# Patient Record
Sex: MALE | Race: WHITE | Employment: FULL TIME | URBAN - METROPOLITAN AREA
[De-identification: names, ages, dates, MRNs, and addresses within clinical notes are randomized per-mention and may not be internally consistent; named-entity substitution may affect disease eponyms.]

---

## 2019-02-18 ENCOUNTER — HOSPITAL ENCOUNTER (EMERGENCY)
Age: 29
Discharge: HOME | End: 2019-02-18
Attending: EMERGENCY MEDICINE
Payer: COMMERCIAL

## 2019-02-18 ENCOUNTER — APPOINTMENT (OUTPATIENT)
Dept: GENERAL RADIOLOGY | Age: 29
End: 2019-02-18
Payer: COMMERCIAL

## 2019-02-18 VITALS
SYSTOLIC BLOOD PRESSURE: 109 MMHG | HEIGHT: 75 IN | RESPIRATION RATE: 14 BRPM | HEART RATE: 91 BPM | BODY MASS INDEX: 28.6 KG/M2 | WEIGHT: 230 LBS | TEMPERATURE: 99.1 F | OXYGEN SATURATION: 97 % | DIASTOLIC BLOOD PRESSURE: 82 MMHG

## 2019-02-18 DIAGNOSIS — R00.2 PALPITATIONS: Primary | ICD-10-CM

## 2019-02-18 DIAGNOSIS — I48.91 TRANSIENT ATRIAL FIBRILLATION (HCC): ICD-10-CM

## 2019-02-18 LAB
ANION GAP SERPL CALCULATED.3IONS-SCNC: 11 MMOL/L (ref 3–16)
BASOPHILS ABSOLUTE: 0.1 K/UL (ref 0–0.2)
BASOPHILS RELATIVE PERCENT: 0.7 %
BUN BLDV-MCNC: 10 MG/DL (ref 7–20)
CALCIUM SERPL-MCNC: 9.8 MG/DL (ref 8.3–10.6)
CHLORIDE BLD-SCNC: 104 MMOL/L (ref 99–110)
CO2: 28 MMOL/L (ref 21–32)
CREAT SERPL-MCNC: 0.8 MG/DL (ref 0.9–1.3)
EKG ATRIAL RATE: 86 BPM
EKG DIAGNOSIS: NORMAL
EKG P AXIS: 68 DEGREES
EKG P-R INTERVAL: 178 MS
EKG Q-T INTERVAL: 340 MS
EKG QRS DURATION: 92 MS
EKG QTC CALCULATION (BAZETT): 406 MS
EKG R AXIS: 62 DEGREES
EKG T AXIS: 54 DEGREES
EKG VENTRICULAR RATE: 86 BPM
EOSINOPHILS ABSOLUTE: 0.1 K/UL (ref 0–0.6)
EOSINOPHILS RELATIVE PERCENT: 1.4 %
GFR AFRICAN AMERICAN: >60
GFR NON-AFRICAN AMERICAN: >60
GLUCOSE BLD-MCNC: 107 MG/DL (ref 70–99)
HCT VFR BLD CALC: 48.4 % (ref 40.5–52.5)
HEMOGLOBIN: 16.7 G/DL (ref 13.5–17.5)
LYMPHOCYTES ABSOLUTE: 2 K/UL (ref 1–5.1)
LYMPHOCYTES RELATIVE PERCENT: 28.8 %
MCH RBC QN AUTO: 32 PG (ref 26–34)
MCHC RBC AUTO-ENTMCNC: 34.6 G/DL (ref 31–36)
MCV RBC AUTO: 92.6 FL (ref 80–100)
MONOCYTES ABSOLUTE: 0.4 K/UL (ref 0–1.3)
MONOCYTES RELATIVE PERCENT: 5.9 %
NEUTROPHILS ABSOLUTE: 4.4 K/UL (ref 1.7–7.7)
NEUTROPHILS RELATIVE PERCENT: 63.2 %
PDW BLD-RTO: 12.3 % (ref 12.4–15.4)
PLATELET # BLD: 268 K/UL (ref 135–450)
PMV BLD AUTO: 8.1 FL (ref 5–10.5)
POTASSIUM SERPL-SCNC: 4.6 MMOL/L (ref 3.5–5.1)
RBC # BLD: 5.23 M/UL (ref 4.2–5.9)
SODIUM BLD-SCNC: 143 MMOL/L (ref 136–145)
TROPONIN: <0.01 NG/ML
WBC # BLD: 7 K/UL (ref 4–11)

## 2019-02-18 PROCEDURE — 84484 ASSAY OF TROPONIN QUANT: CPT

## 2019-02-18 PROCEDURE — 71046 X-RAY EXAM CHEST 2 VIEWS: CPT

## 2019-02-18 PROCEDURE — 99285 EMERGENCY DEPT VISIT HI MDM: CPT

## 2019-02-18 PROCEDURE — 85025 COMPLETE CBC W/AUTO DIFF WBC: CPT

## 2019-02-18 PROCEDURE — 93005 ELECTROCARDIOGRAM TRACING: CPT | Performed by: NURSE PRACTITIONER

## 2019-02-18 PROCEDURE — 80048 BASIC METABOLIC PNL TOTAL CA: CPT

## 2019-02-18 PROCEDURE — 36415 COLL VENOUS BLD VENIPUNCTURE: CPT

## 2019-02-18 PROCEDURE — 6370000000 HC RX 637 (ALT 250 FOR IP): Performed by: PHYSICIAN ASSISTANT

## 2019-02-18 PROCEDURE — 93010 ELECTROCARDIOGRAM REPORT: CPT | Performed by: INTERNAL MEDICINE

## 2019-02-18 RX ORDER — ASPIRIN 81 MG/1
324 TABLET, CHEWABLE ORAL ONCE
Status: COMPLETED | OUTPATIENT
Start: 2019-02-18 | End: 2019-02-18

## 2019-02-18 RX ADMIN — ASPIRIN 81 MG 324 MG: 81 TABLET ORAL at 11:56

## 2019-02-18 ASSESSMENT — ENCOUNTER SYMPTOMS
NAUSEA: 0
COUGH: 0
SHORTNESS OF BREATH: 0
CONSTIPATION: 0
ABDOMINAL DISTENTION: 0
RECTAL PAIN: 0
BACK PAIN: 0
VOMITING: 0
ABDOMINAL PAIN: 0
STRIDOR: 0
DIARRHEA: 0
COLOR CHANGE: 0
WHEEZING: 0

## 2019-03-11 ENCOUNTER — HOSPITAL ENCOUNTER (OUTPATIENT)
Age: 29
Discharge: HOME | End: 2019-03-11
Payer: COMMERCIAL

## 2019-03-11 ENCOUNTER — OFFICE VISIT (OUTPATIENT)
Dept: CARDIOLOGY CLINIC | Age: 29
End: 2019-03-11
Payer: COMMERCIAL

## 2019-03-11 VITALS
WEIGHT: 238.12 LBS | BODY MASS INDEX: 29.61 KG/M2 | HEIGHT: 75 IN | SYSTOLIC BLOOD PRESSURE: 136 MMHG | HEART RATE: 92 BPM | DIASTOLIC BLOOD PRESSURE: 84 MMHG

## 2019-03-11 DIAGNOSIS — I48.0 PAROXYSMAL ATRIAL FIBRILLATION (HCC): ICD-10-CM

## 2019-03-11 LAB — TSH REFLEX: 0.92 UIU/ML (ref 0.27–4.2)

## 2019-03-11 PROCEDURE — 84443 ASSAY THYROID STIM HORMONE: CPT

## 2019-03-11 PROCEDURE — 99202 OFFICE O/P NEW SF 15 MIN: CPT | Performed by: INTERNAL MEDICINE

## 2019-03-11 PROCEDURE — 36415 COLL VENOUS BLD VENIPUNCTURE: CPT

## 2019-03-12 ENCOUNTER — TELEPHONE (OUTPATIENT)
Dept: CARDIOLOGY CLINIC | Age: 29
End: 2019-03-12

## 2019-05-30 ENCOUNTER — HOSPITAL ENCOUNTER (EMERGENCY)
Age: 29
Discharge: HOME | End: 2019-05-30
Attending: EMERGENCY MEDICINE
Payer: COMMERCIAL

## 2019-05-30 ENCOUNTER — APPOINTMENT (OUTPATIENT)
Dept: GENERAL RADIOLOGY | Age: 29
End: 2019-05-30
Payer: COMMERCIAL

## 2019-05-30 VITALS
WEIGHT: 230.13 LBS | BODY MASS INDEX: 28.61 KG/M2 | TEMPERATURE: 98.6 F | SYSTOLIC BLOOD PRESSURE: 149 MMHG | RESPIRATION RATE: 16 BRPM | OXYGEN SATURATION: 98 % | HEART RATE: 84 BPM | DIASTOLIC BLOOD PRESSURE: 86 MMHG | HEIGHT: 75 IN

## 2019-05-30 DIAGNOSIS — R00.2 PALPITATIONS: Primary | ICD-10-CM

## 2019-05-30 LAB
A/G RATIO: 1.6 (ref 1.1–2.2)
ALBUMIN SERPL-MCNC: 4.8 G/DL (ref 3.4–5)
ALP BLD-CCNC: 79 U/L (ref 40–129)
ALT SERPL-CCNC: 25 U/L (ref 10–40)
ANION GAP SERPL CALCULATED.3IONS-SCNC: 13 MMOL/L (ref 3–16)
APTT: 34.2 SEC (ref 26–36)
AST SERPL-CCNC: 20 U/L (ref 15–37)
BASOPHILS ABSOLUTE: 0 K/UL (ref 0–0.2)
BASOPHILS RELATIVE PERCENT: 0.6 %
BILIRUB SERPL-MCNC: 0.6 MG/DL (ref 0–1)
BUN BLDV-MCNC: 11 MG/DL (ref 7–20)
CALCIUM SERPL-MCNC: 9.8 MG/DL (ref 8.3–10.6)
CHLORIDE BLD-SCNC: 103 MMOL/L (ref 99–110)
CO2: 22 MMOL/L (ref 21–32)
CREAT SERPL-MCNC: 0.9 MG/DL (ref 0.9–1.3)
EKG ATRIAL RATE: 90 BPM
EKG DIAGNOSIS: NORMAL
EKG P AXIS: 44 DEGREES
EKG P-R INTERVAL: 158 MS
EKG Q-T INTERVAL: 350 MS
EKG QRS DURATION: 94 MS
EKG QTC CALCULATION (BAZETT): 428 MS
EKG R AXIS: 61 DEGREES
EKG T AXIS: 36 DEGREES
EKG VENTRICULAR RATE: 90 BPM
EOSINOPHILS ABSOLUTE: 0.1 K/UL (ref 0–0.6)
EOSINOPHILS RELATIVE PERCENT: 1 %
GFR AFRICAN AMERICAN: >60
GFR NON-AFRICAN AMERICAN: >60
GLOBULIN: 3 G/DL
GLUCOSE BLD-MCNC: 106 MG/DL (ref 70–99)
HCT VFR BLD CALC: 49.3 % (ref 40.5–52.5)
HEMOGLOBIN: 17.1 G/DL (ref 13.5–17.5)
INR BLD: 1.18 (ref 0.86–1.14)
LYMPHOCYTES ABSOLUTE: 1.5 K/UL (ref 1–5.1)
LYMPHOCYTES RELATIVE PERCENT: 17.4 %
MCH RBC QN AUTO: 31.3 PG (ref 26–34)
MCHC RBC AUTO-ENTMCNC: 34.6 G/DL (ref 31–36)
MCV RBC AUTO: 90.5 FL (ref 80–100)
MONOCYTES ABSOLUTE: 0.4 K/UL (ref 0–1.3)
MONOCYTES RELATIVE PERCENT: 5 %
NEUTROPHILS ABSOLUTE: 6.4 K/UL (ref 1.7–7.7)
NEUTROPHILS RELATIVE PERCENT: 76 %
PDW BLD-RTO: 12.8 % (ref 12.4–15.4)
PLATELET # BLD: 249 K/UL (ref 135–450)
PMV BLD AUTO: 8.8 FL (ref 5–10.5)
POTASSIUM SERPL-SCNC: 4.4 MMOL/L (ref 3.5–5.1)
PROTHROMBIN TIME: 13.5 SEC (ref 9.8–13)
RBC # BLD: 5.45 M/UL (ref 4.2–5.9)
SODIUM BLD-SCNC: 138 MMOL/L (ref 136–145)
TOTAL PROTEIN: 7.8 G/DL (ref 6.4–8.2)
TROPONIN: <0.01 NG/ML
WBC # BLD: 8.4 K/UL (ref 4–11)

## 2019-05-30 PROCEDURE — 85730 THROMBOPLASTIN TIME PARTIAL: CPT

## 2019-05-30 PROCEDURE — 93010 ELECTROCARDIOGRAM REPORT: CPT | Performed by: INTERNAL MEDICINE

## 2019-05-30 PROCEDURE — 85610 PROTHROMBIN TIME: CPT

## 2019-05-30 PROCEDURE — 80053 COMPREHEN METABOLIC PANEL: CPT

## 2019-05-30 PROCEDURE — 85025 COMPLETE CBC W/AUTO DIFF WBC: CPT

## 2019-05-30 PROCEDURE — 71045 X-RAY EXAM CHEST 1 VIEW: CPT

## 2019-05-30 PROCEDURE — 99285 EMERGENCY DEPT VISIT HI MDM: CPT

## 2019-05-30 PROCEDURE — 84484 ASSAY OF TROPONIN QUANT: CPT

## 2019-05-30 PROCEDURE — 93005 ELECTROCARDIOGRAM TRACING: CPT | Performed by: EMERGENCY MEDICINE

## 2019-05-30 NOTE — ED PROVIDER NOTES
Emergency Department Provider Note  Location: [address]  5/30/2019     Patient Identification  Skylar Dias is a 34 y.o. male    Chief Complaint  Palpitations (c/o palpitaions onset this am, history of a-fib )      Mode of Arrival  private car (patient drove)    HPI  (History provided by patient)  This is a 34 y.o. male presented today for \"skip beat\". Patient stated he had similar episode once back in February. He had EKG done at urgent care that was concerning with a-fib with  and sent to our ED. His symptoms already resolved by the time he got to our ED. He follow-up with cardiology and was told to get an echo. He admits he did not get the echo because of a $900 co-pay required by his insurance. He has not followed up since. He returned today because he had an episode of palpitation that felt similar to last about 3 hours. This started while he was at work. 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 By the time he got here, the symptoms started to resolve. He denies excessive caffeine intake. In fact he had none this morning. He said he had his TSH checked in February and it was normal.     ROS  10 systems reviewed, pertinent positives per HPI otherwise noted to be negative    I have reviewed the following nursing documentation:  Allergies: No Known Allergies    Past medical history: none reported    Past surgical history:  has a past surgical history that includes Hand surgery and Tympanostomy tube placement. Home medications: none reported    Social history:  reports that he has been smoking. He has a 2.00 pack-year smoking history. He uses smokeless tobacco. He reports that he drinks alcohol. He reports that he does not use drugs.     Family history:    Family History   Problem Relation Age of Onset    Diabetes Mother     Arthritis Father     Asthma Father        Exam  ED Triage Vitals [05/30/19 1047]   BP Temp Temp Source Pulse Resp SpO2 Height Weight   (!) 143/82 98.6 °F (37 °C) Oral 97 15 95 % 6' 3\" (1.905 m) 230 lb 2 oz (104.4 kg)   Physical Exam   Constitutional: He is oriented to person, place, and time. He appears well-developed and well-nourished. No distress. HENT:   Head: Normocephalic and atraumatic. Eyes: Pupils are equal, round, and reactive to light. Conjunctivae are normal. Right eye exhibits no discharge. Left eye exhibits no discharge. No scleral icterus. Neck: Neck supple. No tracheal deviation present. Cardiovascular: Normal rate, regular rhythm and normal heart sounds. No murmur heard. Pulmonary/Chest: Effort normal and breath sounds normal. No stridor. No respiratory distress. He has no wheezes. Abdominal: Soft. Bowel sounds are normal. He exhibits no distension. There is no tenderness. There is no rebound and no guarding. Musculoskeletal: He exhibits no edema or deformity. Neurological: He is alert and oriented to person, place, and time. He has normal strength. He displays no atrophy. He exhibits normal muscle tone. Skin: Skin is warm and dry. No rash noted. He is not diaphoretic. No cyanosis or erythema. Psychiatric: His behavior is normal.   Nursing note and vitals reviewed. MDM/ED Course  EKG  The Ekg interpreted by me in the absence of a cardiologist shows. normal sinus rhythm with a rate of 90  Axis is   Normal  QTc is  normal  Intervals and Durations are unremarkable. No specific ST-T wave changes appreciated. No evidence of acute ischemia. No significant change from prior EKG dated 2/18/19      Radiology  Xr Chest Portable    Result Date: 5/30/2019  EXAMINATION: ONE XRAY VIEW OF THE CHEST 5/30/2019 11:45 am COMPARISON: February 18, 2019. HISTORY: ORDERING SYSTEM PROVIDED HISTORY: palpitations TECHNOLOGIST PROVIDED HISTORY: Reason for exam:->palpitations Ordering Physician Provided Reason for Exam: Palpitations.  Acuity: Acute Type of Exam: Initial FINDINGS: Cardiac and mediastinal contours are within normal limits. Lungs are hyperinflated, with prominence of interstitial lung markings. No focal consolidation, pneumothorax, or significant pleural effusion. No evidence of acute osseous abnormalities. 1. No radiographic evidence of acute cardiopulmonary process. 2. Hyperinflation of lungs may be seen in setting of COPD or asthma.        Labs  Results for orders placed or performed during the hospital encounter of 05/30/19   CBC Auto Differential   Result Value Ref Range    WBC 8.4 4.0 - 11.0 K/uL    RBC 5.45 4.20 - 5.90 M/uL    Hemoglobin 17.1 13.5 - 17.5 g/dL    Hematocrit 49.3 40.5 - 52.5 %    MCV 90.5 80.0 - 100.0 fL    MCH 31.3 26.0 - 34.0 pg    MCHC 34.6 31.0 - 36.0 g/dL    RDW 12.8 12.4 - 15.4 %    Platelets 267 332 - 672 K/uL    MPV 8.8 5.0 - 10.5 fL    Neutrophils % 76.0 %    Lymphocytes % 17.4 %    Monocytes % 5.0 %    Eosinophils % 1.0 %    Basophils % 0.6 %    Neutrophils # 6.4 1.7 - 7.7 K/uL    Lymphocytes # 1.5 1.0 - 5.1 K/uL    Monocytes # 0.4 0.0 - 1.3 K/uL    Eosinophils # 0.1 0.0 - 0.6 K/uL    Basophils # 0.0 0.0 - 0.2 K/uL   Comprehensive Metabolic Panel   Result Value Ref Range    Sodium 138 136 - 145 mmol/L    Potassium 4.4 3.5 - 5.1 mmol/L    Chloride 103 99 - 110 mmol/L    CO2 22 21 - 32 mmol/L    Anion Gap 13 3 - 16    Glucose 106 (H) 70 - 99 mg/dL    BUN 11 7 - 20 mg/dL    CREATININE 0.9 0.9 - 1.3 mg/dL    GFR Non-African American >60 >60    GFR African American >60 >60    Calcium 9.8 8.3 - 10.6 mg/dL    Total Protein 7.8 6.4 - 8.2 g/dL    Alb 4.8 3.4 - 5.0 g/dL    Albumin/Globulin Ratio 1.6 1.1 - 2.2    Total Bilirubin 0.6 0.0 - 1.0 mg/dL    Alkaline Phosphatase 79 40 - 129 U/L    ALT 25 10 - 40 U/L    AST 20 15 - 37 U/L    Globulin 3.0 g/dL   Protime-INR   Result Value Ref Range    Protime 13.5 (H) 9.8 - 13.0 sec    INR 1.18 (H) 0.86 - 1.14   APTT   Result Value Ref Range    aPTT 34.2 26.0 - 36.0 sec   Troponin   Result Value Ref Range    Troponin <0.01 <0.01 ng/mL       - Patient seen and evaluated in room 32.  34 y.o. male presented for palpitations. He had an episode of A. fib reported on urgent cares EKG back in February. Had another episode since then. His symptoms are resolved before coming here today. His rhythm here has been sinus the entire time. We will refer him back to cardiology for further evaluation. Workup otherwise unremarkable with normal lab and CXR. I estimate there is LOW risk for ACUTE CORONARY SYNDROME, CHRONIC OBSTRUCTIVE PULMONARY DISEASE, CONGESTIVE HEART FAILURE, PERICARDIAL TAMPONADE, PNEUMONIA, PNEUMOTHORAX, PULMONARY EMBOLISM, SEPSIS, and THORACIC DISSECTION,  thus I consider the discharge disposition reasonable. Kurtis Roca and I have discussed the diagnosis and risks, and we agree with discharging home to follow-up with their cardiologist. We also discussed returning to the Emergency Department immediately if new or worsening symptoms occur. We have discussed the symptoms which are most concerning (e.g., bloody sputum, fever, worsening pain or shortness of breath, vomiting) that necessitate immediate return. Clinical Impression:  1. Palpitations        Disposition:  Discharge to home in good condition. Blood pressure (!) 143/82, pulse 97, temperature 98.6 °F (37 °C), temperature source Oral, resp. rate 15, height 6' 3\" (1.905 m), weight 230 lb 2 oz (104.4 kg), SpO2 95 %. This chart was generated in part by using Dragon Dictation system and may contain errors related to that system including errors in grammar, punctuation, and spelling, as well as words and phrases that may be inappropriate. If there are any questions or concerns please feel free to contact the dictating provider for clarification.      Francisco Javier Jain MD  [address] [place] [address] Tereza Delgado MD  05/30/19 6084

## 2019-05-30 NOTE — LETTER
[facility] Emergency Department  [address] [facility] [address]             May 30, 2019    Patient: Paula Ruffin   YOB: 1990   Date of Visit: 5/30/2019       To Whom It May Concern:    Paula Ruffin was seen and treated in our emergency department on 5/30/2019. He may return to work on 6/1/2019.       Sincerely,         [facility]

## 2020-03-25 ENCOUNTER — OFFICE VISIT (OUTPATIENT)
Dept: CARDIOLOGY CLINIC | Age: 30
End: 2020-03-25
Payer: COMMERCIAL

## 2020-03-25 ENCOUNTER — TELEPHONE (OUTPATIENT)
Dept: CARDIOLOGY CLINIC | Age: 30
End: 2020-03-25

## 2020-03-25 VITALS
BODY MASS INDEX: 32.2 KG/M2 | WEIGHT: 259 LBS | SYSTOLIC BLOOD PRESSURE: 140 MMHG | HEIGHT: 75 IN | DIASTOLIC BLOOD PRESSURE: 92 MMHG | HEART RATE: 105 BPM

## 2020-03-25 PROCEDURE — 99214 OFFICE O/P EST MOD 30 MIN: CPT | Performed by: INTERNAL MEDICINE

## 2020-03-25 NOTE — PROGRESS NOTES
[address]   Cardiac Follow Up     Referring Provider:  Val Manzanares     Chief Complaint   Patient presents with    Follow-up     [facility] on the left side of body/Afib    Atrial Fibrillation        History of Present Illness:  Shad Shrestha is a 34 y.o.  man with a history of paroxysmal atrial fibrillation which is likely due to alcohol use. He is here in ED follow up. He states that 2 days ago he had an episode of severe chest pain accompanied by numbness on the  the left side of his body from his face, left arm and leg, he felt he was in afib at the time. He visited the ED, CT of the Chest was negative, EKG showed ST. He reports that he has frequent episodes of chest pain. He states that the pain is in the center of his chest, states chest is sore to palpitation. He reports that he uses alcohol regularly . He also admits to anabolic steroids use. His mother is with him for the visit. Past Medical History:   has a past medical history of Heart palpitations. Surgical History:   has a past surgical history that includes Hand surgery and Tympanostomy tube placement. Social History:   reports that he has been smoking. He has a 2.00 pack-year smoking history. He uses smokeless tobacco. He reports current alcohol use. He reports that he does not use drugs. Family History:  family history includes Arthritis in his father; Asthma in his father; Diabetes in his mother. Home Medications:  Prior to Admission medications    Medication Sig Start Date End Date Taking? Authorizing Provider   metoprolol tartrate (LOPRESSOR) 25 MG tablet Take 1 tablet by mouth 2 times daily 3/25/20  Yes Fabio Green MD        Allergies:  Patient has no known allergies. Review of Systems:   · Constitutional: there has been no unanticipated weight loss. There's been no change in energy level, sleep pattern, or activity level. · Eyes: No visual changes or diplopia.  No scleral extremities well  · Exhibits normal gait balance and coordination  · No abnormalities of mood, affect, memory, mentation, or behavior are noted      Assessment:      Chest pain   Plan:stress echo asap     Paroxymal atrial fib   EKG 3/23/20> ST,    Regular today     Hypertension   Blood pressure (!) 140/92, pulse 105, height 6' 3\" (1.905 m), weight 259 lb (117.5 kg). Elevated     Alcohol and steroid use  Strongly encouraged cessation. Also likely cocaine use which he does not acknowledge      Plan:  Cardiac test and lab results personally reviewed by me during this office visit and discussed. Strongly encouraged alcohol and steroid use cessation. Continue risk factor modifications. Call for any change in symptoms. Return for regular follow up in 3 months or sooner based on testing results. I appreciate the opportunity of cooperating in the care of this individual.    Yuly Hearing. Cheryl Lockhart M.D., [facility]    Patient's problem list, medications, allergies, past medical, surgical, social and family histories were reviewed and updated as appropriate. Scribe's attestation: This note was scribed in the presence of Dr. Cheryl Lockhart MD, by Yulia De La Torre RN. The scribe's documentation has been prepared under my direction and personally reviewed by me in its entirety. I confirm that the note above accurately reflects all work, treatment, procedures, and medical decision making performed by me.

## 2020-04-01 ENCOUNTER — OFFICE VISIT (OUTPATIENT)
Dept: CARDIOLOGY CLINIC | Age: 30
End: 2020-04-01
Payer: COMMERCIAL

## 2020-04-01 LAB
LV EF: 50 %
LVEF MODALITY: NORMAL

## 2020-04-01 PROCEDURE — 93351 STRESS TTE COMPLETE: CPT | Performed by: INTERNAL MEDICINE

## 2020-04-01 PROCEDURE — 93325 DOPPLER ECHO COLOR FLOW MAPG: CPT | Performed by: INTERNAL MEDICINE

## 2020-04-01 PROCEDURE — 93320 DOPPLER ECHO COMPLETE: CPT | Performed by: INTERNAL MEDICINE

## 2020-04-01 NOTE — LETTER
[address] [facility] Cardiology  [address] [place] [id number]  Phone: [phone]  Fax: [phone]          April 1, 2020     Patient: Robina Moreira       Date of Visit: 4/1/2020       To Whom it May Concern:    Robina Moreira was seen in my clinic on 4/1/2020. He may return to work on 4/1/20. If you have any questions or concerns, please don't hesitate to call.     Sincerely,       Francisco Santamaria MD

## 2021-01-04 ENCOUNTER — TELEPHONE (OUTPATIENT)
Dept: CARDIOLOGY CLINIC | Age: 31
End: 2021-01-04

## 2021-01-04 NOTE — TELEPHONE ENCOUNTER
[facility] get [facility] following [facility].  Pls call to advise Thank you      Medication Refill    Medication needing refilled: metoprolol tartrate (LOPRESSOR) 25 MG tablet     Dosage of the medication: 25 mg    How are you taking this medication (QD, BID, TID, QID, PRN): 1 tab BID    30 or 90 day supply called in: 80    Which Pharmacy are we sending the medication to?:[facility] [address] [facility] [phone] [facility] [phone]   [address] [place] [address], [address] [place] [address]   Phone:  [phone]  Fax:  [phone]

## 2021-01-04 NOTE — TELEPHONE ENCOUNTER
Patient called and requested refill. Refill was E-prescribed to pharmacy.  Pt has an apt scheduled in February

## 2021-01-07 ENCOUNTER — APPOINTMENT (OUTPATIENT)
Dept: GENERAL RADIOLOGY | Age: 31
End: 2021-01-07
Payer: COMMERCIAL

## 2021-01-07 ENCOUNTER — TELEPHONE (OUTPATIENT)
Dept: CARDIOLOGY CLINIC | Age: 31
End: 2021-01-07

## 2021-01-07 ENCOUNTER — HOSPITAL ENCOUNTER (OUTPATIENT)
Age: 31
Setting detail: OBSERVATION
Discharge: HOME | End: 2021-01-08
Attending: EMERGENCY MEDICINE | Admitting: INTERNAL MEDICINE
Payer: COMMERCIAL

## 2021-01-07 DIAGNOSIS — I48.0 PAROXYSMAL ATRIAL FIBRILLATION (HCC): ICD-10-CM

## 2021-01-07 DIAGNOSIS — I48.91 ATRIAL FIBRILLATION WITH RVR (HCC): Primary | ICD-10-CM

## 2021-01-07 LAB
A/G RATIO: 1.4 (ref 1.1–2.2)
ALBUMIN SERPL-MCNC: 4.2 G/DL (ref 3.4–5)
ALP BLD-CCNC: 54 U/L (ref 40–129)
ALT SERPL-CCNC: 32 U/L (ref 10–40)
ANION GAP SERPL CALCULATED.3IONS-SCNC: 10 MMOL/L (ref 3–16)
APTT: 29.1 SEC (ref 24.2–36.2)
AST SERPL-CCNC: 30 U/L (ref 15–37)
BASOPHILS ABSOLUTE: 0 K/UL (ref 0–0.2)
BASOPHILS RELATIVE PERCENT: 0.5 %
BILIRUB SERPL-MCNC: 0.6 MG/DL (ref 0–1)
BUN BLDV-MCNC: 10 MG/DL (ref 7–20)
CALCIUM SERPL-MCNC: 9.1 MG/DL (ref 8.3–10.6)
CHLORIDE BLD-SCNC: 104 MMOL/L (ref 99–110)
CO2: 23 MMOL/L (ref 21–32)
CREAT SERPL-MCNC: 0.8 MG/DL (ref 0.9–1.3)
EKG ATRIAL RATE: 122 BPM
EKG DIAGNOSIS: NORMAL
EKG Q-T INTERVAL: 280 MS
EKG QRS DURATION: 90 MS
EKG QTC CALCULATION (BAZETT): 389 MS
EKG R AXIS: 43 DEGREES
EKG T AXIS: 42 DEGREES
EKG VENTRICULAR RATE: 116 BPM
EOSINOPHILS ABSOLUTE: 0.1 K/UL (ref 0–0.6)
EOSINOPHILS RELATIVE PERCENT: 0.6 %
ETHANOL: NORMAL MG/DL (ref 0–0.08)
GFR AFRICAN AMERICAN: >60
GFR NON-AFRICAN AMERICAN: >60
GLOBULIN: 3 G/DL
GLUCOSE BLD-MCNC: 110 MG/DL (ref 70–99)
HCT VFR BLD CALC: 48.8 % (ref 40.5–52.5)
HEMOGLOBIN: 16.5 G/DL (ref 13.5–17.5)
INR BLD: 1.12 (ref 0.86–1.14)
LYMPHOCYTES ABSOLUTE: 1.1 K/UL (ref 1–5.1)
LYMPHOCYTES RELATIVE PERCENT: 11 %
MCH RBC QN AUTO: 32.9 PG (ref 26–34)
MCHC RBC AUTO-ENTMCNC: 33.7 G/DL (ref 31–36)
MCV RBC AUTO: 97.7 FL (ref 80–100)
MONOCYTES ABSOLUTE: 0.7 K/UL (ref 0–1.3)
MONOCYTES RELATIVE PERCENT: 6.7 %
NEUTROPHILS ABSOLUTE: 8 K/UL (ref 1.7–7.7)
NEUTROPHILS RELATIVE PERCENT: 81.2 %
PDW BLD-RTO: 13.3 % (ref 12.4–15.4)
PLATELET # BLD: 308 K/UL (ref 135–450)
PMV BLD AUTO: 7.8 FL (ref 5–10.5)
POTASSIUM REFLEX MAGNESIUM: 3.7 MMOL/L (ref 3.5–5.1)
PRO-BNP: 617 PG/ML (ref 0–124)
PROTHROMBIN TIME: 13 SEC (ref 10–13.2)
RBC # BLD: 5 M/UL (ref 4.2–5.9)
REASON FOR REJECTION: NORMAL
REASON FOR REJECTION: NORMAL
REJECTED TEST: NORMAL
REJECTED TEST: NORMAL
SODIUM BLD-SCNC: 137 MMOL/L (ref 136–145)
TOTAL PROTEIN: 7.2 G/DL (ref 6.4–8.2)
TROPONIN: <0.01 NG/ML
WBC # BLD: 9.8 K/UL (ref 4–11)

## 2021-01-07 PROCEDURE — 85025 COMPLETE CBC W/AUTO DIFF WBC: CPT

## 2021-01-07 PROCEDURE — 84443 ASSAY THYROID STIM HORMONE: CPT

## 2021-01-07 PROCEDURE — 2500000003 HC RX 250 WO HCPCS: Performed by: EMERGENCY MEDICINE

## 2021-01-07 PROCEDURE — 6370000000 HC RX 637 (ALT 250 FOR IP): Performed by: INTERNAL MEDICINE

## 2021-01-07 PROCEDURE — 96375 TX/PRO/DX INJ NEW DRUG ADDON: CPT

## 2021-01-07 PROCEDURE — 2580000003 HC RX 258: Performed by: INTERNAL MEDICINE

## 2021-01-07 PROCEDURE — G0378 HOSPITAL OBSERVATION PER HR: HCPCS

## 2021-01-07 PROCEDURE — 6370000000 HC RX 637 (ALT 250 FOR IP): Performed by: EMERGENCY MEDICINE

## 2021-01-07 PROCEDURE — 85730 THROMBOPLASTIN TIME PARTIAL: CPT

## 2021-01-07 PROCEDURE — 80053 COMPREHEN METABOLIC PANEL: CPT

## 2021-01-07 PROCEDURE — 6360000002 HC RX W HCPCS: Performed by: EMERGENCY MEDICINE

## 2021-01-07 PROCEDURE — 96366 THER/PROPH/DIAG IV INF ADDON: CPT

## 2021-01-07 PROCEDURE — 83880 ASSAY OF NATRIURETIC PEPTIDE: CPT

## 2021-01-07 PROCEDURE — 99283 EMERGENCY DEPT VISIT LOW MDM: CPT

## 2021-01-07 PROCEDURE — G0480 DRUG TEST DEF 1-7 CLASSES: HCPCS

## 2021-01-07 PROCEDURE — 85610 PROTHROMBIN TIME: CPT

## 2021-01-07 PROCEDURE — 71045 X-RAY EXAM CHEST 1 VIEW: CPT

## 2021-01-07 PROCEDURE — 36415 COLL VENOUS BLD VENIPUNCTURE: CPT

## 2021-01-07 PROCEDURE — 96365 THER/PROPH/DIAG IV INF INIT: CPT

## 2021-01-07 PROCEDURE — 84484 ASSAY OF TROPONIN QUANT: CPT

## 2021-01-07 PROCEDURE — 93005 ELECTROCARDIOGRAM TRACING: CPT | Performed by: EMERGENCY MEDICINE

## 2021-01-07 PROCEDURE — 2500000003 HC RX 250 WO HCPCS: Performed by: INTERNAL MEDICINE

## 2021-01-07 PROCEDURE — 93010 ELECTROCARDIOGRAM REPORT: CPT | Performed by: INTERNAL MEDICINE

## 2021-01-07 RX ORDER — VALACYCLOVIR HYDROCHLORIDE 500 MG/1
500 TABLET, FILM COATED ORAL DAILY
COMMUNITY
End: 2021-01-07 | Stop reason: ALTCHOICE

## 2021-01-07 RX ORDER — ACETAMINOPHEN 325 MG/1
650 TABLET ORAL EVERY 6 HOURS PRN
Status: DISCONTINUED | OUTPATIENT
Start: 2021-01-07 | End: 2021-01-08 | Stop reason: HOSPADM

## 2021-01-07 RX ORDER — METOPROLOL TARTRATE 5 MG/5ML
2.5 INJECTION INTRAVENOUS EVERY 6 HOURS PRN
Status: DISCONTINUED | OUTPATIENT
Start: 2021-01-07 | End: 2021-01-08 | Stop reason: HOSPADM

## 2021-01-07 RX ORDER — SODIUM CHLORIDE 0.9 % (FLUSH) 0.9 %
10 SYRINGE (ML) INJECTION EVERY 12 HOURS SCHEDULED
Status: DISCONTINUED | OUTPATIENT
Start: 2021-01-07 | End: 2021-01-08 | Stop reason: HOSPADM

## 2021-01-07 RX ORDER — ONDANSETRON 2 MG/ML
4 INJECTION INTRAMUSCULAR; INTRAVENOUS EVERY 6 HOURS PRN
Status: DISCONTINUED | OUTPATIENT
Start: 2021-01-07 | End: 2021-01-08 | Stop reason: HOSPADM

## 2021-01-07 RX ORDER — SODIUM CHLORIDE 0.9 % (FLUSH) 0.9 %
10 SYRINGE (ML) INJECTION PRN
Status: DISCONTINUED | OUTPATIENT
Start: 2021-01-07 | End: 2021-01-08 | Stop reason: HOSPADM

## 2021-01-07 RX ORDER — PROMETHAZINE HYDROCHLORIDE 25 MG/1
12.5 TABLET ORAL EVERY 6 HOURS PRN
Status: DISCONTINUED | OUTPATIENT
Start: 2021-01-07 | End: 2021-01-08 | Stop reason: HOSPADM

## 2021-01-07 RX ORDER — POTASSIUM CHLORIDE 20 MEQ/1
40 TABLET, EXTENDED RELEASE ORAL ONCE
Status: COMPLETED | OUTPATIENT
Start: 2021-01-07 | End: 2021-01-07

## 2021-01-07 RX ORDER — MAGNESIUM SULFATE IN WATER 40 MG/ML
2 INJECTION, SOLUTION INTRAVENOUS ONCE
Status: COMPLETED | OUTPATIENT
Start: 2021-01-07 | End: 2021-01-07

## 2021-01-07 RX ORDER — DILTIAZEM HYDROCHLORIDE 5 MG/ML
10 INJECTION INTRAVENOUS ONCE
Status: COMPLETED | OUTPATIENT
Start: 2021-01-07 | End: 2021-01-07

## 2021-01-07 RX ORDER — ACETAMINOPHEN 650 MG/1
650 SUPPOSITORY RECTAL EVERY 6 HOURS PRN
Status: DISCONTINUED | OUTPATIENT
Start: 2021-01-07 | End: 2021-01-08 | Stop reason: HOSPADM

## 2021-01-07 RX ORDER — POLYETHYLENE GLYCOL 3350 17 G/17G
17 POWDER, FOR SOLUTION ORAL DAILY PRN
Status: DISCONTINUED | OUTPATIENT
Start: 2021-01-07 | End: 2021-01-08 | Stop reason: HOSPADM

## 2021-01-07 RX ADMIN — Medication 10 ML: at 20:05

## 2021-01-07 RX ADMIN — METOPROLOL TARTRATE 25 MG: 25 TABLET, FILM COATED ORAL at 18:32

## 2021-01-07 RX ADMIN — DILTIAZEM HYDROCHLORIDE 10 MG: 5 INJECTION INTRAVENOUS at 12:47

## 2021-01-07 RX ADMIN — DILTIAZEM HYDROCHLORIDE 30 MG: 30 TABLET, FILM COATED ORAL at 23:47

## 2021-01-07 RX ADMIN — DILTIAZEM HYDROCHLORIDE 30 MG: 30 TABLET, FILM COATED ORAL at 12:47

## 2021-01-07 RX ADMIN — MAGNESIUM SULFATE IN WATER 2 G: 40 INJECTION, SOLUTION INTRAVENOUS at 12:47

## 2021-01-07 RX ADMIN — METOPROLOL TARTRATE 2.5 MG: 5 INJECTION INTRAVENOUS at 16:16

## 2021-01-07 RX ADMIN — POTASSIUM CHLORIDE 40 MEQ: 1500 TABLET, EXTENDED RELEASE ORAL at 16:16

## 2021-01-07 RX ADMIN — ASPIRIN 325 MG: 325 TABLET, COATED ORAL at 18:32

## 2021-01-07 RX ADMIN — DILTIAZEM HYDROCHLORIDE 30 MG: 30 TABLET, FILM COATED ORAL at 18:32

## 2021-01-07 ASSESSMENT — ENCOUNTER SYMPTOMS
COUGH: 0
NAUSEA: 0
DIARRHEA: 0
BACK PAIN: 0
WHEEZING: 0
CONSTIPATION: 0
VOMITING: 0
ABDOMINAL PAIN: 0
STRIDOR: 0
COLOR CHANGE: 0
SHORTNESS OF BREATH: 1
ABDOMINAL DISTENTION: 0

## 2021-01-07 ASSESSMENT — PAIN SCALES - GENERAL: PAINLEVEL_OUTOF10: 0

## 2021-01-07 NOTE — ED NOTES
Pt medicated per eMAR. Remains cycling on cardiac monitor, IV mag infusing at this time. Pt resting in bed with no acute sign of distress at this time. Call light within reach.       Mauricio Whiteside RN  81/32/75 1257

## 2021-01-07 NOTE — H&P
Hospital Medicine History and Physical    1/7/2021    Date of Admission: 1/7/2021    Date of Service: Pt seen/examined on 1/7/2021 and admitted to observation. Assessment/plan:  1. Atrial fibrillation with rapid ventricular response. Heart rate actually much improved since starting p.o. Cardizem in the emergency room. At the time of my evaluation, heart rate mostly less than 100 bpm.  Will continue p.o. Cardizem, resume home metoprolol and add as needed IV Lopressor for heart rate greater than 110 bpm.  Start full dose aspirin. TSH is pending. Patient had recent echo and stress testing April 2020, will not repeat. 2. Chest pain and palpitations. Likely secondary to #1. As noted above, patient had recent echo stress test in April 2020. Continue serial troponin. Potassium is 3.7; will give 20 mEq of p.o. potassium. Recheck potassium, magnesium and phosphorus levels in the morning. 3. Other comorbidities: Obesity with BMI of 32.5 kg/m². Activities: Up with assist  Prophylaxis: Subcutaneous Lovenox  Code status: Full code    ==========================================================  Chief complaint:  Chief Complaint   Patient presents with    Atrial Fibrillation     pt states he has had irregular HR and SOB since 4am.  Pt left from [address] [place] [address] to come here because his cardiologist is here. Pt was told his heart enzymes are elevated       History of Presenting Illness: This is a pleasant 27 y.o. male with history of anabolic steroid use, history of paroxysmal atrial fibrillation, obesity with BMI of 32.5 kg/m², who presents to the emergency room with complaints of palpitations, shortness of breath, onset since 4 AM today. Reports that he was recently evaluated at [facility] earlier today, noted he had elevated heart enzyme while he was there.   He ended up signing out [address] to present to [facility] so he could be evaluated by his cardiologist.  At time of my evaluation, patient appears comfortable, has heart rate mostly less than 100 beats per minutes. Troponin obtained here is nondetectable. EKG demonstrates atrial fibrillation with RVR. Patient has been started on p.o. Cardizem in the emergency room. He is being admitted for further evaluation. Past Medical History:      Diagnosis Date    Heart palpitations        Past Surgical History:      Procedure Laterality Date    HAND SURGERY      LEFT - ORIF Boxer's Fracture    TYMPANOSTOMY TUBE PLACEMENT         Medications (prior to admission):  Prior to Admission medications    Medication Sig Start Date End Date Taking? Authorizing Provider   metoprolol tartrate (LOPRESSOR) 25 MG tablet TAKE ONE TABLET BY MOUTH TWICE A DAY 1/4/21  Yes Cristine Calderon MD       Allergy(ies):  Patient has no known allergies. Social History:  TOBACCO:  reports that he has been smoking. He has a 2.00 pack-year smoking history. He uses smokeless tobacco.  ETOH:  reports current alcohol use. Family History:      Problem Relation Age of Onset    Diabetes Mother     Arthritis Father     Asthma Father        Review of Systems:  Pertinent positives are listed in HPI. At least 10-point ROS reviewed and were negative. Vitals and physical examination:  /71   Pulse 95   Temp 98 °F (36.7 °C) (Infrared)   Resp 20   Ht 6' 3\" (1.905 m)   Wt 260 lb (117.9 kg)   SpO2 95%   BMI 32.50 kg/m²   Gen/overall appearance: Not in acute distress. Alert. Oriented x3. Head: Normocephalic, atraumatic  Eyes: EOMI, good acuity  ENT: Oral mucosa moist  Neck: No JVD, thyromegaly  CVS: Nml S1S2, no MRG, RRR  Pulm: Clear bilaterally. No crackles/wheezes  Gastrointestinal: Soft, NT/ND, +BS  Musculoskeletal: No edema. Warm  Neuro: No focal deficit. Moves extremity spontaneously. Psychiatry: Appropriate affect. Not agitated. Skin: Warm, dry with normal turgor.  No rash  Capillary refill: Brisk,< 3 seconds   Peripheral Pulses: +2 palpable, equal bilaterally       Labs/imaging/EKG:  CBC:   Recent Labs     01/07/21  1240   WBC 9.8   HGB 16.5        BMP:    Recent Labs     01/07/21  1356      K 3.7      CO2 23   BUN 10   CREATININE 0.8*   GLUCOSE 110*     Hepatic:   Recent Labs     01/07/21  1356   AST 30   ALT 32   BILITOT 0.6   ALKPHOS 47       Xr Chest Portable    Result Date: 1/7/2021  EXAMINATION: ONE XRAY VIEW OF THE CHEST 1/7/2021 12:30 pm COMPARISON: Chest radiograph May 30, 2019 and priors. HISTORY: ORDERING SYSTEM PROVIDED HISTORY: AFIB TECHNOLOGIST PROVIDED HISTORY: Reason for exam:->AFIB Reason for Exam: feels rapid heart rate Acuity: Acute Type of Exam: Initial FINDINGS: The lungs are without acute focal process. There is no effusion or pneumothorax. Mild prominence of the hilum is unchanged compared to priors, likely related to vasculature. The cardiomediastinal silhouette is without acute process. The osseous structures are without acute process. No significant change compared to prior. No significant change compared to prior. No acute process. EKG: Atrial fibrillation, rate 116 beats per minutes. No acute ST/T changes. I reviewed EKG. Discussed with ER provider.       Thank you Concepcion Hensley for the opportunity to be involved in this patient's care.    -----------------------------  Jennifer Muniz MD  [facility] hospitalist

## 2021-01-07 NOTE — TELEPHONE ENCOUNTER
Mother calling asking to speak to nurse about this patient . Patient has been in the ER since 12:30 and has not seen a doctor yet. Mother wants to know if patient is going to be seen in this office today ? Sent message to MB RN since she spoke to her this morning.

## 2021-01-07 NOTE — ED PROVIDER NOTES
I independently performed a history and physical on Joanna Cody. All diagnostic, treatment, and disposition decisions were made by myself in conjunction with the advanced practice provider. Briefly, this is a 27 y.o. male here for Afib, waking him from sleep at 0400, persisting until now  [employer] to [facility]) got Cardizem, and they were going to admit there for rate control and troponin leak; he instead left AMA and drove here. Follows with Dr. Tiffany Martinez  No chest pain, dyspnea, n/v/d. On exam, WDWN M, NAD, Heart Rapid, irreg irreg, lungs cTAB, no r/r/w. Abdomen soft, NT, ND    EKG   EKG is reviewed myself. Dated today 1225. Rate 116. A. fib with RVR. Prior EKG dated 30 May 2019 is sinus rhythm     Yazan Sheffield MD  01/07/21 1226        Screenings               MDM  32yo M with hx of PAF secondary to anabolic steroid and alcohol use, here today in Af with RVR. [facility] in [facility], and left AMA To come here because of prior cardiology follow up. Was drinking last night, and had some recent anabolic steroid use. Recd' Cardizem in [facility] with some improvement. Started magnesium, fluid bolus, Cardizem here. Will admit, CS cards. 15min Critical care Time  Indication and intervention as above. Patient Referrals:  No follow-up provider specified. Discharge Medications:  New Prescriptions    No medications on file       FINAL IMPRESSION  1. Paroxysmal atrial fibrillation (HCC)        Blood pressure (!) 149/80, pulse 123, temperature 98 °F (36.7 °C), temperature source Infrared, resp. rate 21, height 6' 3\" (1.905 m), weight 260 lb (117.9 kg), SpO2 97 %. For further details of [facility] emergency department encounter, please see documentation by advanced practice provider, Kb Williamson.          Yazan Sheffield MD  01/09/21 [id number]

## 2021-01-07 NOTE — PROGRESS NOTES
Pt seen in  ED, admission completed. Pt is alert and oriented x 4. Pt lives at home with his mother and is being admitted for observation for episode of Atrial fibrillation. Pt c/o chest pain and palpitations. Plan of care updated, all questions answered.

## 2021-01-07 NOTE — ED PROVIDER NOTES
[facility]        Pt Name: Chris Katz  MRN: [id number]  [place] 1990  Date of evaluation: 1/7/2021  Provider: Crispin Lemons PA-C  PCP: Radha Gallegos     I have seen and evaluated this patient with my supervising physician Belkis Membreno MD.    [facility]       Chief Complaint   Patient presents with    Atrial Fibrillation     pt states he has had irregular HR and SOB since 4am.  Pt left from [address] [place] [address] to come here because his cardiologist is here. Pt was told his heart enzymes are elevated       HISTORY OF PRESENT ILLNESS   (Location, Timing/Onset, Context/Setting, Quality, Duration, Modifying Factors, Severity, Associated Signs and Symptoms)  Note limiting factors. Chris Katz is a 27 y.o. male with history of atrial fibrillation who presents to the emergency department complaining of palpitations and shortness of breath starting this morning at 4 AM.  He states that it woke him up. He went to another hospital and was evaluated for this. However, he signed out [address] because he wanted to come down to [facility] because his cardiologist, Dr. Carmita Serrato, is located at our facility. Patient denies chest pain. He denies illicit drug use but states that he does use anabolic steroids and drank alcohol heavily last night. He states that he does normally consume alcohol almost daily but not typically in large quantities. He denies other illicit drug use. Nursing Notes were all reviewed and agreed with or any disagreements were addressed in the HPI. REVIEW OF SYSTEMS    (2-9 systems for level 4, 10 or more for level 5)     Review of Systems   Constitutional: Negative for chills and fever. HENT: Negative. Eyes: Negative for visual disturbance. Respiratory: Positive for shortness of breath. Negative for cough, wheezing and stridor. Cardiovascular: Positive for palpitations. Negative for chest pain and leg swelling. Gastrointestinal: Negative for abdominal distention, abdominal pain, constipation, diarrhea, nausea and vomiting. Endocrine: Negative. Genitourinary: Negative. Musculoskeletal: Negative for back pain, neck pain and neck stiffness. Skin: Negative for color change, pallor, rash and wound. Neurological: Negative for dizziness, tremors, seizures, syncope, facial asymmetry, speech difficulty, weakness, light-headedness, numbness and headaches. Psychiatric/Behavioral: Negative for confusion. All other systems reviewed and are negative. Positives and Pertinent negatives as per HPI. Except as noted above in the ROS, all other systems were reviewed and negative. PAST MEDICAL HISTORY     Past Medical History:   Diagnosis Date    Heart palpitations          SURGICAL HISTORY     Past Surgical History:   Procedure Laterality Date    HAND SURGERY      LEFT - ORIF Boxer's Fracture    TYMPANOSTOMY TUBE PLACEMENT           CURRENTMEDICATIONS       Previous Medications    METOPROLOL TARTRATE (LOPRESSOR) 25 MG TABLET    TAKE ONE TABLET BY MOUTH TWICE A DAY    VALACYCLOVIR (VALTREX) 500 MG TABLET    Take 500 mg by mouth daily         ALLERGIES     Patient has no known allergies.     FAMILYHISTORY       Family History   Problem Relation Age of Onset    Diabetes Mother     Arthritis Father     Asthma Father           SOCIAL HISTORY       Social History     Tobacco Use    Smoking status: Current Every Day Smoker     Packs/day: 0.50     Years: 4.00     Pack years: 2.00    Smokeless tobacco: Current User   Substance Use Topics    Alcohol use: Yes     Comment: SOCIALLY    Drug use: No       SCREENINGS             PHYSICAL EXAM    (up to 7 for level 4, 8 or more for level 5)     ED Triage Vitals [01/07/21 1226]   BP Temp Temp Source Pulse Resp SpO2 Height Weight   (!) 147/106 98 °F (36.7 °C) Infrared 104 24 97 % 6' 3\" (1.905 m) 260 lb (117.9 kg)       Physical Exam  Vitals signs and nursing note reviewed. Constitutional:       Appearance: Normal appearance. He is well-developed. He is not toxic-appearing or diaphoretic. HENT:      Head: Normocephalic and atraumatic. Right Ear: External ear normal.      Left Ear: External ear normal.      Nose: Nose normal.      Mouth/Throat:      Mouth: Mucous membranes are moist.      Pharynx: Oropharynx is clear. Eyes:      General: No scleral icterus. Right eye: No discharge. Left eye: No discharge. Extraocular Movements: Extraocular movements intact. Conjunctiva/sclera: Conjunctivae normal.      Pupils: Pupils are equal, round, and reactive to light. Neck:      Musculoskeletal: Normal range of motion. Cardiovascular:      Rate and Rhythm: Tachycardia present. Rhythm irregular. Pulmonary:      Effort: Pulmonary effort is normal.      Breath sounds: Normal breath sounds. Abdominal:      General: Bowel sounds are normal. There is no distension. Palpations: Abdomen is soft. Tenderness: There is no abdominal tenderness. There is no right CVA tenderness or left CVA tenderness. Musculoskeletal: Normal range of motion. Skin:     General: Skin is warm and dry. Capillary Refill: Capillary refill takes less than 2 seconds. Coloration: Skin is not jaundiced or pale. Findings: No bruising, erythema, lesion or rash. Neurological:      General: No focal deficit present. Mental Status: He is alert and oriented to person, place, and time.    Psychiatric:         Mood and Affect: Mood normal.         Behavior: Behavior normal.         DIAGNOSTIC RESULTS   LABS:    Labs Reviewed   CBC WITH AUTO DIFFERENTIAL - Abnormal; Notable for the following components:       Result Value    Neutrophils Absolute 8.0 (*)     All other components within normal limits    Narrative:     Performed at:  [facility] [address], [facility], [address]   Phone 21  - Abnormal; Notable for the following components:    Pro- (*)     All other components within normal limits    Narrative:     Performed at:  [facility] [address], [place], [address] [employer]   Phone [phone]   COMPREHENSIVE METABOLIC PANEL W/ REFLEX TO MG FOR LOW K - Abnormal; Notable for the following components:    Glucose 110 (*)     CREATININE 0.8 (*)     All other components within normal limits    Narrative:     Performed at:  [facility] [address], [place], [facility] [address] [employer]   Phone [phone]    Narrative:     [facility] tel. [phone],  Rejected Test Name bnp,cmp++,PT,PTT/Called to:Mary Espinoza, 01/07/2021  13:04, by [facility]  Performed at:  [facility] [address], [place], [facility]   Phone [phone]   PROTIME-INR    Narrative:     [facility] tel. [phone],  Rejected Test Name cmp,bnp,etoh,trop/Called to:Gwendolyn Quiroz, 01/07/2021  13:35, by [facility]  Performed at:  [facility] [address], [place], [address] [facility]   Phone [phone]   APTT    Narrative:     [facility] tel. [phone],  Rejected Test Name cmp,bnp,etoh,trop/Called to:Gwendolyn Quiroz, 01/07/2021  13:35, by [facility]  Performed at:  [facility] [address], [place], [address] [employer]   Phone [phone]    Narrative:     Flex [place]  [place] [phone],  Rejected Test Name cmp,bnp,etoh,trop/Called to:Gwendolyn Quiroz, 01/07/2021  13:35, by [facility]  Performed at:  [facility] [address], [place], [address] [employer]   Phone [phone]   ETHANOL    Narrative:     Performed at:  [facility] [address], [place], [address]   Phone [phone]   TROPONIN    Narrative: Performed at:  [facility]  [address], [address]   Phone [phone]   URINE DRUG SCREEN   TSH WITHOUT REFLEX       All other labs were within normal range or not returned as of this dictation. EKG: All EKG's are interpreted by the Emergency Department Physician in the absence of a cardiologist.  Please see their note for interpretation of EKG. RADIOLOGY:   Non-plain film images such as CT, Ultrasound and MRI are read by the radiologist. Plain radiographic images are visualized and preliminarily interpreted by the ED Provider with the below findings:        Interpretation per the Radiologist below, if available at the time of this note:    XR CHEST PORTABLE   Final Result   No acute process. PROCEDURES   Unless otherwise noted below, none     Procedures    CRITICAL CARE TIME   Critical Care  There was a high probability of life-threatening clinical deterioration in the patient's condition requiring my urgent intervention. Total critical care time with the patient was 32 minutes excluding separately reportable procedures. Critical care required due to patients afib with rvr prompting medical management, consultation and admission. CONSULTS:  My attending discussed the case with Dr Marlon Perez, cardiologist, on call. IP CONSULT TO HOSPITALIST  I consulted with Dr Jose Luis Rice at 8199 for admission.      EMERGENCY DEPARTMENT COURSE and DIFFERENTIAL DIAGNOSIS/MDM:   Vitals:    Vitals:    01/07/21 1226 01/07/21 1242   BP: (!) 147/106 (!) 149/80   Pulse: 104 123   Resp: 24 21   Temp: 98 °F (36.7 °C)    TempSrc: Infrared    SpO2: 97%    Weight: 260 lb (117.9 kg)    Height: 6' 3\" (1.905 m)        Patient was given the following medications:  Medications   magnesium sulfate 2 g in 50 mL IVPB premix (2 g Intravenous New Bag 1/7/21 1247)   dilTIAZem (CARDIZEM) tablet 30 mg (30 mg Oral Given 1/7/21 1247)   dilTIAZem injection 10 mg (10 mg Intravenous Given 1/7/21 1247)         This patient presents to the emergency department complaining of shortness of breath and palpitations. Chest x-ray shows no acute intrathoracic abnormality. Troponin is negative. He admits to heavy drinking last night. He was counseled on alcohol cessation. He denies other illicit drug use. He does admit to anabolic steroids. We did order Cardizem and magnesium. Patient understands and agrees with plan for admission. We did consult with cardiology and hospitalist for admission. My suspicion is low for ACS, PE, myocarditis, pericarditis, endocarditis, acute pulmonary edema, pleural effusion, pericardial effusion, cardiac tamponade, CHF exacerbation, thoracic aortic dissection, esophageal rupture, hypertensive urgency or emergency, hemothorax, pulmonary contusion, subcutaneous emphysema, flail chest, pneumo mediastinum, rib fracture, COVID-19, pneumonia, pneumothorax, or other concerning pathology. FINAL IMPRESSION      1. Atrial fibrillation with RVR (HCC)    2. Paroxysmal atrial fibrillation [facility])          DISPOSITION/PLAN   DISPOSITION Decision To Admit 01/07/2021 01:27:59 PM      PATIENT REFERREDTO:  No follow-up provider specified.     DISCHARGE MEDICATIONS:  New Prescriptions    No medications on file       DISCONTINUED MEDICATIONS:  Discontinued Medications    No medications on file              (Please note that portions of this note were completed with a voice recognition program.  Efforts were made to edit the dictations but occasionally words are mis-transcribed.)    Magi Alcazar PA-C (electronically signed)            Magi Alcazar PA-C  01/07/21 4310

## 2021-01-08 VITALS
TEMPERATURE: 97.6 F | BODY MASS INDEX: 31.83 KG/M2 | HEART RATE: 92 BPM | OXYGEN SATURATION: 95 % | SYSTOLIC BLOOD PRESSURE: 128 MMHG | HEIGHT: 75 IN | WEIGHT: 256 LBS | RESPIRATION RATE: 18 BRPM | DIASTOLIC BLOOD PRESSURE: 92 MMHG

## 2021-01-08 LAB
ALBUMIN SERPL-MCNC: 4.2 G/DL (ref 3.4–5)
ANION GAP SERPL CALCULATED.3IONS-SCNC: 8 MMOL/L (ref 3–16)
BUN BLDV-MCNC: 10 MG/DL (ref 7–20)
CALCIUM SERPL-MCNC: 8.7 MG/DL (ref 8.3–10.6)
CHLORIDE BLD-SCNC: 104 MMOL/L (ref 99–110)
CO2: 25 MMOL/L (ref 21–32)
CREAT SERPL-MCNC: 0.9 MG/DL (ref 0.9–1.3)
GFR AFRICAN AMERICAN: >60
GFR NON-AFRICAN AMERICAN: >60
GLUCOSE BLD-MCNC: 110 MG/DL (ref 70–99)
MAGNESIUM: 2.3 MG/DL (ref 1.8–2.4)
PHOSPHORUS: 2.4 MG/DL (ref 2.5–4.9)
POTASSIUM SERPL-SCNC: 4 MMOL/L (ref 3.5–5.1)
SODIUM BLD-SCNC: 137 MMOL/L (ref 136–145)
TSH SERPL DL<=0.05 MIU/L-ACNC: 0.83 UIU/ML (ref 0.27–4.2)

## 2021-01-08 PROCEDURE — 36415 COLL VENOUS BLD VENIPUNCTURE: CPT

## 2021-01-08 PROCEDURE — 99245 OFF/OP CONSLTJ NEW/EST HI 55: CPT | Performed by: INTERNAL MEDICINE

## 2021-01-08 PROCEDURE — 80069 RENAL FUNCTION PANEL: CPT

## 2021-01-08 PROCEDURE — G0378 HOSPITAL OBSERVATION PER HR: HCPCS

## 2021-01-08 PROCEDURE — 6370000000 HC RX 637 (ALT 250 FOR IP): Performed by: EMERGENCY MEDICINE

## 2021-01-08 PROCEDURE — 6370000000 HC RX 637 (ALT 250 FOR IP): Performed by: INTERNAL MEDICINE

## 2021-01-08 PROCEDURE — 2580000003 HC RX 258: Performed by: INTERNAL MEDICINE

## 2021-01-08 PROCEDURE — 83735 ASSAY OF MAGNESIUM: CPT

## 2021-01-08 RX ORDER — METOPROLOL TARTRATE 50 MG/1
50 TABLET, FILM COATED ORAL 2 TIMES DAILY
Status: DISCONTINUED | OUTPATIENT
Start: 2021-01-08 | End: 2021-01-08 | Stop reason: HOSPADM

## 2021-01-08 RX ORDER — METOPROLOL TARTRATE 50 MG/1
TABLET, FILM COATED ORAL
Qty: 60 TABLET | Refills: 1 | Status: SHIPPED | OUTPATIENT
Start: 2021-01-08 | End: 2022-01-11 | Stop reason: SDUPTHER

## 2021-01-08 RX ADMIN — Medication 10 ML: at 08:25

## 2021-01-08 RX ADMIN — METOPROLOL TARTRATE 25 MG: 25 TABLET, FILM COATED ORAL at 08:04

## 2021-01-08 RX ADMIN — DILTIAZEM HYDROCHLORIDE 30 MG: 30 TABLET, FILM COATED ORAL at 06:04

## 2021-01-08 RX ADMIN — ASPIRIN 325 MG: 325 TABLET, COATED ORAL at 08:04

## 2021-01-08 ASSESSMENT — PAIN SCALES - GENERAL
PAINLEVEL_OUTOF10: 0

## 2021-01-08 NOTE — CONSULTS
[facility]   Electrophysiology Consultation   Date: 1/8/2021  Reason for Consultation: Atrial fibrillation   Consult Requesting Physician: Jessica Hernández MD     Chief Complaint   Patient presents with    Atrial Fibrillation     pt states he has had irregular HR and SOB since 4am.  Pt left from [address] [place] [address] to come here because his cardiologist is here. Pt was told his heart enzymes are elevated     HPI: Nini Montague is a 27 y.o. male admitted with Atrial fibrillation after binge drinking . Atrial fibrillation  started 4 am and ended around 6 pm.He has been on metoprolol(Dr. Leodan Tripathi) but ran out of it and was not taking it as instructed. He has had another episode with similar pattern. He had some status post.  He works out and uses steroid. Stress test was normal.    Past Medical History:   Diagnosis Date    Heart palpitations         Past Surgical History:   Procedure Laterality Date    HAND SURGERY      LEFT - ORIF Boxer's Fracture    TYMPANOSTOMY TUBE PLACEMENT         No Known Allergies    Social History:  Reviewed. reports that he has quit smoking. He started smoking about 2 months ago. He has a 2.00 pack-year smoking history. He uses smokeless tobacco. He reports current alcohol use. He reports that he does not use drugs. Family History:  Reviewed. family history includes Arthritis in his father; Asthma in his father; Diabetes in his mother. Review of System:  All other systems reviewed and are negative except for that noted above.  Pertinent negatives are:     · General: negative for fever, chills   · Ophthalmic ROS: negative for - eye pain or loss of vision  · ENT ROS: negative for - headaches, sore throat   · Respiratory: negative for - cough, sputum  · Cardiovascular: Reviewed in HPI  · Gastrointestinal: negative for - abdominal pain, diarrhea, N/V  · Hematology: negative for - bleeding, blood clots, bruising or jaundice  · Genito-Urinary:  negative for - Dysuria or poor nutrition while binging. He can be discharged with f/u with Dr. Juliana Wood. Thank you for allowing me to participate in the care of Magdiel Cristina     NOTE: This report was transcribed using voice recognition software. Every effort was made to ensure accuracy, however, inadvertent computerized transcription errors may be present.

## 2021-01-08 NOTE — PROGRESS NOTES
Data- discharge order received, pt verbalized agreement to discharge, disposition to previous residence, no needs for HHC/DME. Action- discharge instructions prepared and given to pt, pt verbalized understanding. Medication information packet given r/t NEW and/or CHANGED prescriptions emphasizing name/purpose/side effects, pt verbalized understanding. Discharge instruction summary: Diet- general, Activity- independent, Primary Care Physician as followsAris Hare [phone] f/u appointment reviewed and complete, immunizations reviewed and complete, prescription medications filled . Inpatient surgical procedure precautions reviewed: and complete CHF Education reviewed. Pt/ Family has had a total of 60 minutes CHF education this admission encounter. Response- Pt belongings gathered, IV removed. Disposition is home (no HHC/DME needs), transported with RN, taken to lobby via w/c w/ Wheelchair, no complications.

## 2021-01-08 NOTE — DISCHARGE SUMMARY
Hospital Discharge Summary    Patient's PCP: Roney Ruth Date: 1/7/2021   Discharge Date: 1/8/2021    Admitting Physician: Dr. Eleanor Castelan MD  Discharge Physician: Dr. Lori Bentley:   Nora Delaney    Brief HPI: Patient admitted with atrial fibrillation with rapid ventricular response. Brief hospital course: 70-year-old male with history of anabolic steroid use, paroxysmal atrial fibrillation, obesity with BMI of 32 kg/m², who presents to the emergency room with complaints of palpitations, shortness of breath. He was noted to be in atrial fibrillation with rapid ventricular response. It appears the patient has been out of his p.o. metoprolol. He was admitted, resumed on p.o. metoprolol. He received a dose of IV Cardizem 10 mg in the emergency room with somewhat improved heart rate. He was then started on p.o. Cardizem. He has been evaluated by EP, recommended okay for discharge. Home dose of metoprolol has been increased from 25 mg daily to 50 mg daily. He has low RIIIF0Zrcm score of 0. He will follow up with cardiology as outpatient. Discharged in stable condition. Invasive procedures:  Home    Discharge Diagnoses: Active Problems:    Atrial fibrillation with RVR (HCC)    Chest pain    Low phosphate levels    Alcohol abuse  Resolved Problems:    * No resolved hospital problems. *      Physical Exam: BP (!) 128/92   Pulse 92   Temp 97.6 °F (36.4 °C) (Tympanic)   Resp 18   Ht 6' 3\" (1.905 m)   Wt 256 lb (116.1 kg)   SpO2 95%   BMI 32.00 kg/m²   Gen/overall appearance: Not in acute distress. Alert. Oriented x3. Head: Normocephalic, atraumatic  Eyes: EOMI, good acuity  ENT: Oral mucosa moist  Neck: No JVD, thyromegaly  CVS: Nml S1S2, no MRG, RRR  Pulm: Clear bilaterally. No crackles/wheezes  Gastrointestinal: Soft, NT/ND, +BS  Musculoskeletal: No edema. Warm  Neuro: No focal deficit.  Moves extremity spontaneously. Psychiatry: Appropriate affect. Not agitated. Skin: Warm, dry with normal turgor. No rash  Capillary refill: Brisk,< 3 seconds   Peripheral Pulses: +2 palpable, equal bilaterally     Significant diagnostic studies that may require follow up:  Xr Chest Portable    Result Date: 1/7/2021  EXAMINATION: ONE XRAY VIEW OF THE CHEST 1/7/2021 12:30 pm COMPARISON: Chest radiograph May 30, 2019 and priors. HISTORY: ORDERING SYSTEM PROVIDED HISTORY: AFIB TECHNOLOGIST PROVIDED HISTORY: Reason for exam:->AFIB Reason for Exam: feels rapid heart rate Acuity: Acute Type of Exam: Initial FINDINGS: The lungs are without acute focal process. There is no effusion or pneumothorax. Mild prominence of the hilum is unchanged compared to priors, likely related to vasculature. The cardiomediastinal silhouette is without acute process. The osseous structures are without acute process. No significant change compared to prior. No significant change compared to prior. No acute process. Treatments: As above. Discharge Medications:     Medication List      CHANGE how you take these medications    metoprolol tartrate 50 MG tablet  Commonly known as: LOPRESSOR  TAKE ONE TABLET BY MOUTH TWICE A DAY  What changed: medication strength  Notes to patient: Use: Beta Blocker used to treat high blood pressure, chest pain (angina) and heart failure. May low risk of death after having a heart attacks  Side effects: Tiredness, dry mouth, loose stools, indigestion, dizziness & blurred vision.         STOP taking these medications    valACYclovir 500 MG tablet  Commonly known as: VALTREX           Where to Get Your Medications      These medications were sent to [facility] [address]    Phone: [phone]   · metoprolol tartrate 50 MG tablet         Activity: activity as tolerated  Diet: DIET GENERAL;      Disposition: home  Discharged Condition: Stable  Follow Up:   [facility] [address]  [phone]    Schedule an appointment as soon as possible for a visit in 1 week      Ming Mills MD  [address]    Schedule an appointment as soon as possible for a visit in 1 day      [facility] [address]  [phone]    In 1 week        Code status:  Full Code     Total time spent on discharge, finalizing medications, referrals and arranging outpatient follow up was more than 20 minutes      Thank you Dr. Sheryle Rebel for the opportunity to be involved in this patients care.

## 2021-01-08 NOTE — PROGRESS NOTES
Shift assessment complete. VSS. Pt denies any pain. Pt remains SR-ST. Pt aware of POC, NPO after MN. Will continue to monitor.

## 2021-12-28 ENCOUNTER — HOSPITAL ENCOUNTER (EMERGENCY)
Age: 31
Discharge: HOME | End: 2021-12-28

## 2021-12-28 LAB
EKG ATRIAL RATE: 95 BPM
EKG DIAGNOSIS: NORMAL
EKG P AXIS: 65 DEGREES
EKG P-R INTERVAL: 192 MS
EKG Q-T INTERVAL: 344 MS
EKG QRS DURATION: 94 MS
EKG QTC CALCULATION (BAZETT): 432 MS
EKG R AXIS: 45 DEGREES
EKG T AXIS: 44 DEGREES
EKG VENTRICULAR RATE: 95 BPM

## 2021-12-28 PROCEDURE — 93010 ELECTROCARDIOGRAM REPORT: CPT | Performed by: INTERNAL MEDICINE

## 2021-12-28 PROCEDURE — 93005 ELECTROCARDIOGRAM TRACING: CPT | Performed by: EMERGENCY MEDICINE

## 2021-12-28 NOTE — ED NOTES
Pt. Was called on phone after not being able to find him. Pt. States that he left after his ekg because he was not in a fib.       Ryne Hanna RN  12/28/21 3972

## 2022-01-10 ENCOUNTER — TELEPHONE (OUTPATIENT)
Dept: CARDIOLOGY CLINIC | Age: 32
End: 2022-01-10

## 2022-01-10 NOTE — TELEPHONE ENCOUNTER
Pt called to scheduled to schedule appt with LES, he stated he is currently in AFIB   Pt stated he has been in the ER couple times and he is out of his mediations. RM does not have any appts, please advise thank you     When and where can pt agustin scheduled?     Ramona Barros   [phone]

## 2022-01-10 NOTE — TELEPHONE ENCOUNTER
I talked to Ji Orozco. He had been out of the BB for quite some time. He states he can feel the \"skipped beats\" a lot more often recently but especially at night. He did not know a Rx for Lopressor 50mg bid was sent to the pharmacy by the hospitalist; I advised him to pick it up today and start taking it as directed. I instructed him to call if symptoms worsened. OV made for 1/24/22 at the [facility] office. He would like to discuss causative factors for atrial fib. Last OV was 3/25/2020. He stated he missed a couple prior appt's because of insurance issues that has now been straightened out.

## 2022-01-10 NOTE — TELEPHONE ENCOUNTER
He was seen in [facility] ER 1/7/21, admitted overnight to observation with EP consult. Given IV diltiazem for AF RVR (after binge drinking) with improved HR. Discharged with doubled metoprolol dose (he had been out of it awhile prior to ER visit). He uses anabolic steroids.

## 2022-01-11 ENCOUNTER — TELEPHONE (OUTPATIENT)
Dept: CARDIOLOGY CLINIC | Age: 32
End: 2022-01-11

## 2022-01-11 RX ORDER — METOPROLOL TARTRATE 50 MG/1
TABLET, FILM COATED ORAL
Qty: 180 TABLET | Refills: 0 | Status: SHIPPED | OUTPATIENT
Start: 2022-01-11 | End: 2022-04-07 | Stop reason: SDUPTHER

## 2022-01-11 NOTE — TELEPHONE ENCOUNTER
Reviewed chart, sent Rx. He needs OP follow up. Follows with Dr. Anthony Shi.      Madhu Sharp, APRN-CNP

## 2022-01-11 NOTE — TELEPHONE ENCOUNTER
Pt did not get RX in Jan, pt stated he did not know it was called in and he will be out. Geovanna Restocked the medication.   Pt has appt 1/24    metoprolol tartrate (LOPRESSOR) 50 MG tablet   As Directed  60 tablet  TAKE ONE TABLET BY MOUTH TWICE A DAY    [facility] [address], [place] [address] [address] [address] [phone] [place] [phone]   [address] [place] [address], [address] [place] [address]   Phone:  [phone]  Fax:  [phone]

## 2022-03-26 ENCOUNTER — HOSPITAL ENCOUNTER (OUTPATIENT)
Age: 32
Setting detail: OBSERVATION
Discharge: HOME | End: 2022-03-27
Attending: EMERGENCY MEDICINE | Admitting: INTERNAL MEDICINE
Payer: COMMERCIAL

## 2022-03-26 DIAGNOSIS — I48.0 PAROXYSMAL ATRIAL FIBRILLATION (HCC): Primary | ICD-10-CM

## 2022-03-26 PROCEDURE — 99284 EMERGENCY DEPT VISIT MOD MDM: CPT

## 2022-03-26 NOTE — LETTER
[facility]  [address]  Phone: [phone]          March 27, 2022     Patient: Apollo Schroeder   YOB: 1990   Date of Visit: 3/26/2022       To Whom It May Concern:    Mr Apollo Schroeder has been under ou care here at [facility] from 3/26/2022 to 3/27/2022. It is my medical opinion that Apollo Schroeder may return to work on 03/29/2022. If you have any questions or concerns, please don't hesitate to call.     Sincerely,        Mckayla Rivera RN

## 2022-03-27 ENCOUNTER — APPOINTMENT (OUTPATIENT)
Dept: GENERAL RADIOLOGY | Age: 32
End: 2022-03-27
Payer: COMMERCIAL

## 2022-03-27 VITALS
RESPIRATION RATE: 14 BRPM | WEIGHT: 250.4 LBS | HEIGHT: 74 IN | HEART RATE: 93 BPM | DIASTOLIC BLOOD PRESSURE: 77 MMHG | SYSTOLIC BLOOD PRESSURE: 121 MMHG | BODY MASS INDEX: 32.14 KG/M2 | TEMPERATURE: 97.4 F | OXYGEN SATURATION: 95 %

## 2022-03-27 PROBLEM — I48.91 ATRIAL FIBRILLATION WITH RAPID VENTRICULAR RESPONSE (HCC): Status: ACTIVE | Noted: 2022-03-27

## 2022-03-27 LAB
A/G RATIO: 1.6 (ref 1.1–2.2)
ALBUMIN SERPL-MCNC: 4.4 G/DL (ref 3.4–5)
ALP BLD-CCNC: 85 U/L (ref 40–129)
ALT SERPL-CCNC: 24 U/L (ref 10–40)
ANION GAP SERPL CALCULATED.3IONS-SCNC: 11 MMOL/L (ref 3–16)
AST SERPL-CCNC: 32 U/L (ref 15–37)
BASOPHILS ABSOLUTE: 0.1 K/UL (ref 0–0.2)
BASOPHILS RELATIVE PERCENT: 1 %
BILIRUB SERPL-MCNC: 0.4 MG/DL (ref 0–1)
BUN BLDV-MCNC: 13 MG/DL (ref 7–20)
CALCIUM SERPL-MCNC: 9.6 MG/DL (ref 8.3–10.6)
CHLORIDE BLD-SCNC: 102 MMOL/L (ref 99–110)
CO2: 24 MMOL/L (ref 21–32)
CREAT SERPL-MCNC: 1 MG/DL (ref 0.9–1.3)
D DIMER: <200 NG/ML DDU (ref 0–229)
EKG ATRIAL RATE: 89 BPM
EKG DIAGNOSIS: NORMAL
EKG Q-T INTERVAL: 318 MS
EKG QRS DURATION: 94 MS
EKG QTC CALCULATION (BAZETT): 438 MS
EKG R AXIS: 24 DEGREES
EKG T AXIS: 25 DEGREES
EKG VENTRICULAR RATE: 114 BPM
EOSINOPHILS ABSOLUTE: 0.3 K/UL (ref 0–0.6)
EOSINOPHILS RELATIVE PERCENT: 3.4 %
GFR AFRICAN AMERICAN: >60
GFR NON-AFRICAN AMERICAN: >60
GLUCOSE BLD-MCNC: 96 MG/DL (ref 70–99)
HCT VFR BLD CALC: 46.8 % (ref 40.5–52.5)
HEMOGLOBIN: 16.4 G/DL (ref 13.5–17.5)
LYMPHOCYTES ABSOLUTE: 2.5 K/UL (ref 1–5.1)
LYMPHOCYTES RELATIVE PERCENT: 27.9 %
MCH RBC QN AUTO: 32.5 PG (ref 26–34)
MCHC RBC AUTO-ENTMCNC: 35.1 G/DL (ref 31–36)
MCV RBC AUTO: 92.7 FL (ref 80–100)
MONOCYTES ABSOLUTE: 0.6 K/UL (ref 0–1.3)
MONOCYTES RELATIVE PERCENT: 7 %
NEUTROPHILS ABSOLUTE: 5.5 K/UL (ref 1.7–7.7)
NEUTROPHILS RELATIVE PERCENT: 60.7 %
PDW BLD-RTO: 13.8 % (ref 12.4–15.4)
PLATELET # BLD: 267 K/UL (ref 135–450)
PMV BLD AUTO: 8.7 FL (ref 5–10.5)
POTASSIUM SERPL-SCNC: 4.7 MMOL/L (ref 3.5–5.1)
PRO-BNP: 50 PG/ML (ref 0–124)
RBC # BLD: 5.04 M/UL (ref 4.2–5.9)
REASON FOR REJECTION: NORMAL
REJECTED TEST: NORMAL
SODIUM BLD-SCNC: 137 MMOL/L (ref 136–145)
TOTAL PROTEIN: 7.2 G/DL (ref 6.4–8.2)
TROPONIN: <0.01 NG/ML
WBC # BLD: 9 K/UL (ref 4–11)

## 2022-03-27 PROCEDURE — 2500000003 HC RX 250 WO HCPCS: Performed by: EMERGENCY MEDICINE

## 2022-03-27 PROCEDURE — 93010 ELECTROCARDIOGRAM REPORT: CPT | Performed by: INTERNAL MEDICINE

## 2022-03-27 PROCEDURE — 99243 OFF/OP CNSLTJ NEW/EST LOW 30: CPT | Performed by: INTERNAL MEDICINE

## 2022-03-27 PROCEDURE — 83880 ASSAY OF NATRIURETIC PEPTIDE: CPT

## 2022-03-27 PROCEDURE — 6370000000 HC RX 637 (ALT 250 FOR IP): Performed by: INTERNAL MEDICINE

## 2022-03-27 PROCEDURE — 96366 THER/PROPH/DIAG IV INF ADDON: CPT

## 2022-03-27 PROCEDURE — 96365 THER/PROPH/DIAG IV INF INIT: CPT

## 2022-03-27 PROCEDURE — 93005 ELECTROCARDIOGRAM TRACING: CPT | Performed by: EMERGENCY MEDICINE

## 2022-03-27 PROCEDURE — 96372 THER/PROPH/DIAG INJ SC/IM: CPT

## 2022-03-27 PROCEDURE — 71045 X-RAY EXAM CHEST 1 VIEW: CPT

## 2022-03-27 PROCEDURE — 6360000002 HC RX W HCPCS: Performed by: INTERNAL MEDICINE

## 2022-03-27 PROCEDURE — 84484 ASSAY OF TROPONIN QUANT: CPT

## 2022-03-27 PROCEDURE — 80053 COMPREHEN METABOLIC PANEL: CPT

## 2022-03-27 PROCEDURE — G0378 HOSPITAL OBSERVATION PER HR: HCPCS

## 2022-03-27 PROCEDURE — 85379 FIBRIN DEGRADATION QUANT: CPT

## 2022-03-27 PROCEDURE — 36415 COLL VENOUS BLD VENIPUNCTURE: CPT

## 2022-03-27 PROCEDURE — 2580000003 HC RX 258: Performed by: INTERNAL MEDICINE

## 2022-03-27 PROCEDURE — 85025 COMPLETE CBC W/AUTO DIFF WBC: CPT

## 2022-03-27 RX ORDER — POLYETHYLENE GLYCOL 3350 17 G/17G
17 POWDER, FOR SOLUTION ORAL DAILY PRN
Status: DISCONTINUED | OUTPATIENT
Start: 2022-03-27 | End: 2022-03-27 | Stop reason: HOSPADM

## 2022-03-27 RX ORDER — DILTIAZEM HYDROCHLORIDE 180 MG/1
180 CAPSULE, COATED, EXTENDED RELEASE ORAL DAILY
Qty: 30 CAPSULE | Refills: 2 | Status: SHIPPED
Start: 2022-03-28 | End: 2022-04-07 | Stop reason: SINTOL

## 2022-03-27 RX ORDER — DILTIAZEM HCL/D5W 125 MG/125
2.5-15 PLASTIC BAG, INJECTION (ML) INTRAVENOUS CONTINUOUS
Status: DISCONTINUED | OUTPATIENT
Start: 2022-03-27 | End: 2022-03-27

## 2022-03-27 RX ORDER — SODIUM CHLORIDE 0.9 % (FLUSH) 0.9 %
5-40 SYRINGE (ML) INJECTION EVERY 12 HOURS SCHEDULED
Status: DISCONTINUED | OUTPATIENT
Start: 2022-03-27 | End: 2022-03-27 | Stop reason: HOSPADM

## 2022-03-27 RX ORDER — ONDANSETRON 2 MG/ML
4 INJECTION INTRAMUSCULAR; INTRAVENOUS EVERY 6 HOURS PRN
Status: DISCONTINUED | OUTPATIENT
Start: 2022-03-27 | End: 2022-03-27 | Stop reason: HOSPADM

## 2022-03-27 RX ORDER — SODIUM CHLORIDE 9 MG/ML
25 INJECTION, SOLUTION INTRAVENOUS PRN
Status: DISCONTINUED | OUTPATIENT
Start: 2022-03-27 | End: 2022-03-27 | Stop reason: HOSPADM

## 2022-03-27 RX ORDER — DILTIAZEM HYDROCHLORIDE 180 MG/1
180 CAPSULE, COATED, EXTENDED RELEASE ORAL DAILY
Status: DISCONTINUED | OUTPATIENT
Start: 2022-03-27 | End: 2022-03-27 | Stop reason: HOSPADM

## 2022-03-27 RX ORDER — ONDANSETRON 4 MG/1
4 TABLET, ORALLY DISINTEGRATING ORAL EVERY 8 HOURS PRN
Status: DISCONTINUED | OUTPATIENT
Start: 2022-03-27 | End: 2022-03-27 | Stop reason: HOSPADM

## 2022-03-27 RX ORDER — METOPROLOL TARTRATE 50 MG/1
50 TABLET, FILM COATED ORAL 2 TIMES DAILY
Status: DISCONTINUED | OUTPATIENT
Start: 2022-03-27 | End: 2022-03-27 | Stop reason: HOSPADM

## 2022-03-27 RX ORDER — ACETAMINOPHEN 650 MG/1
650 SUPPOSITORY RECTAL EVERY 6 HOURS PRN
Status: DISCONTINUED | OUTPATIENT
Start: 2022-03-27 | End: 2022-03-27 | Stop reason: HOSPADM

## 2022-03-27 RX ORDER — SODIUM CHLORIDE 0.9 % (FLUSH) 0.9 %
5-40 SYRINGE (ML) INJECTION PRN
Status: DISCONTINUED | OUTPATIENT
Start: 2022-03-27 | End: 2022-03-27 | Stop reason: HOSPADM

## 2022-03-27 RX ORDER — 0.9 % SODIUM CHLORIDE 0.9 %
1000 INTRAVENOUS SOLUTION INTRAVENOUS ONCE
Status: COMPLETED | OUTPATIENT
Start: 2022-03-27 | End: 2022-03-27

## 2022-03-27 RX ORDER — ASPIRIN 81 MG/1
81 TABLET, CHEWABLE ORAL DAILY
Status: DISCONTINUED | OUTPATIENT
Start: 2022-03-27 | End: 2022-03-27 | Stop reason: HOSPADM

## 2022-03-27 RX ORDER — ACETAMINOPHEN 325 MG/1
650 TABLET ORAL EVERY 6 HOURS PRN
Status: DISCONTINUED | OUTPATIENT
Start: 2022-03-27 | End: 2022-03-27 | Stop reason: HOSPADM

## 2022-03-27 RX ADMIN — Medication 5 MG/HR: at 06:33

## 2022-03-27 RX ADMIN — ACETAMINOPHEN 650 MG: 325 TABLET ORAL at 05:59

## 2022-03-27 RX ADMIN — METOPROLOL TARTRATE 50 MG: 50 TABLET ORAL at 04:33

## 2022-03-27 RX ADMIN — SODIUM CHLORIDE 1000 ML: 9 INJECTION, SOLUTION INTRAVENOUS at 04:33

## 2022-03-27 RX ADMIN — ENOXAPARIN SODIUM 40 MG: 40 INJECTION SUBCUTANEOUS at 09:34

## 2022-03-27 RX ADMIN — METOPROLOL TARTRATE 50 MG: 50 TABLET ORAL at 09:34

## 2022-03-27 RX ADMIN — ASPIRIN 81 MG 81 MG: 81 TABLET ORAL at 09:34

## 2022-03-27 RX ADMIN — Medication 2.5 MG/HR: at 01:28

## 2022-03-27 RX ADMIN — DILTIAZEM HYDROCHLORIDE 180 MG: 180 CAPSULE, COATED, EXTENDED RELEASE ORAL at 10:43

## 2022-03-27 ASSESSMENT — PAIN DESCRIPTION - FREQUENCY
FREQUENCY: CONTINUOUS
FREQUENCY: INTERMITTENT

## 2022-03-27 ASSESSMENT — PAIN DESCRIPTION - PROGRESSION: CLINICAL_PROGRESSION: GRADUALLY IMPROVING

## 2022-03-27 ASSESSMENT — PAIN SCALES - GENERAL
PAINLEVEL_OUTOF10: 2
PAINLEVEL_OUTOF10: 1
PAINLEVEL_OUTOF10: 2
PAINLEVEL_OUTOF10: 2
PAINLEVEL_OUTOF10: 1

## 2022-03-27 ASSESSMENT — PAIN DESCRIPTION - LOCATION
LOCATION: CHEST
LOCATION: CHEST

## 2022-03-27 ASSESSMENT — PAIN DESCRIPTION - ONSET: ONSET: ON-GOING

## 2022-03-27 ASSESSMENT — PAIN DESCRIPTION - ORIENTATION
ORIENTATION: MID
ORIENTATION: RIGHT

## 2022-03-27 ASSESSMENT — PAIN DESCRIPTION - PAIN TYPE
TYPE: ACUTE PAIN
TYPE: ACUTE PAIN

## 2022-03-27 ASSESSMENT — PAIN - FUNCTIONAL ASSESSMENT: PAIN_FUNCTIONAL_ASSESSMENT: ACTIVITIES ARE NOT PREVENTED

## 2022-03-27 ASSESSMENT — PAIN DESCRIPTION - DESCRIPTORS
DESCRIPTORS: OTHER (COMMENT)
DESCRIPTORS: DISCOMFORT

## 2022-03-27 NOTE — ED PROVIDER NOTES
 HAND SURGERY      LEFT - ORIF Boxer's Fracture    TYMPANOSTOMY TUBE PLACEMENT           CURRENT MEDICATIONS       Previous Medications    METOPROLOL TARTRATE (LOPRESSOR) 50 MG TABLET    TAKE ONE TABLET BY MOUTH TWICE A DAY       ALLERGIES     Patient has no known allergies. FAMILY HISTORY       Family History   Problem Relation Age of Onset    Diabetes Mother     Arthritis Father     Asthma Father           SOCIAL HISTORY       Social History     Socioeconomic History    Marital status: Single     Spouse name: None    Number of children: None    Years of education: None    Highest education level: None   Occupational History    None   Tobacco Use    Smoking status: Former Smoker     Packs/day: 0.50     Years: 4.00     Pack years: 2.00     Start date: 11/7/2020    Smokeless tobacco: Current User   Vaping Use    Vaping Use: Some days    Last attempt to quit: 2/25/2020    Substances: Nicotine   Substance and Sexual Activity    Alcohol use: Yes     Comment: SOCIALLY    Drug use: Yes     Types: Marijuana Fredick Copping)    Sexual activity: Yes     Partners: Female   Other Topics Concern    None   Social History Narrative    None     Social Determinants of Health     Financial Resource Strain:     Difficulty of Paying Living Expenses: Not on file   Food Insecurity:     Worried About Running Out of Food in the Last Year: Not on file    Shu of Food in the Last Year: Not on file   Transportation Needs:     Lack of Transportation (Medical): Not on file    Lack of Transportation (Non-Medical):  Not on file   Physical Activity:     Days of Exercise per Week: Not on file    Minutes of Exercise per Session: Not on file   Stress:     Feeling of Stress : Not on file   Social Connections:     Frequency of Communication with Friends and Family: Not on file    Frequency of Social Gatherings with Friends and Family: Not on file    Attends Mandaen Services: Not on file   [employer] of Clubs or Organizations: Not on file    Attends Club or Organization Meetings: Not on file    Marital Status: Not on file   Intimate Partner Violence:     Fear of Current or Ex-Partner: Not on file    Emotionally Abused: Not on file    Physically Abused: Not on file    Sexually Abused: Not on file   Housing Stability:     Unable to Pay for Housing in the Last Year: Not on file    Number of [place] in the Last Year: Not on file    Unstable Housing in the Last Year: Not on file       SCREENINGS    [place] Coma Scale  Eye Opening: Spontaneous  Best Verbal Response: Oriented  Best Motor Response: Obeys commands  Christen Coma Scale Score: 15        PHYSICAL EXAM    (up to 7 for level 4, 8 or more for level 5)     ED Triage Vitals [03/27/22 0007]   BP Temp Temp Source Pulse Resp SpO2 Height Weight   (!) 144/114 98 °F (36.7 °C) Oral 89 15 96 % -- --           General Appearance:  Alert, cooperative, no distress, appears stated age. Head:  Normocephalic, without obvious abnormality, atraumatic. Eyes:  conjunctiva/corneas clear, EOM's intact. Sclera anicteric. ENT: Mucous membranes moist.   Neck: Supple, symmetrical, trachea midline, no adenopathy. No jugular venous distention. Lungs:   No Respiratory Distress. no rales  rhonchi rub   Chest Wall:  Nontender  no deformity   Heart:   irregular rhythm no murmer gallop    Abdomen:   Soft nontender no organomegally    Extremities:  Full range of motion. no deformity   Pulses: Equal  upper and lower    Skin:  No rashes or lesions to exposed skin. Neurologic: Alert and oriented X 3. Motor grossly normal.  Speech clear. Cr n 2-12 intact       DIAGNOSTIC RESULTS   LABS:    Labs Reviewed   BASIC METABOLIC PANEL   TROPONIN   CBC WITH AUTO DIFFERENTIAL   COMPREHENSIVE METABOLIC PANEL W/ REFLEX TO MG FOR LOW K   BRAIN NATRIURETIC PEPTIDE   D-DIMER, QUANTITATIVE       All other labs were within normal range or not returned as of thisdictation. EKG:  All EKG's are interpreted by the Emergency Department Physician who either signs or Co-signs this chart in the absence of a cardiologist.    EKG Interpretation    Interpreted by emergency department physician    Rhythm: atrial fibrillation - rapid  Rate: 110-120  Axis: normal  Ectopy: none  Conduction: normal  ST Segments: no acute change  T Waves: no acute change  Q Waves: none    Clinical Impression: Atrial fibrillation rapid response    Chanel Lanza MD      RADIOLOGY:   Non-plain film images such as CT, Ultrasound and MRI are read by the radiologist. Plainradiographic images are visualized and preliminarily interpreted by the  ED Provider with the belowfindings:        Interpretation per the Radiologist below, if available at the time of this note:    XR CHEST PORTABLE   Final Result   No airspace disease by radiograph. PROCEDURES   Unless otherwise noted below, none     Procedures    CRITICAL CARE TIME   Code care time was 31 minutes exclusive of procedures including evaluation treatment and IV Cardizem discussion with the hospitalist    CONSULTS:  None    EMERGENCY DEPARTMENT COURSE and DIFFERENTIAL DIAGNOSIS/MDM:   Vitals:    Vitals:    03/27/22 0210 03/27/22 0211 03/27/22 0212 03/27/22 0213   BP:  119/71     Pulse: 103 95 98 92   Resp: 21 17 10 17   Temp:       TempSrc:       SpO2: 98% 95% 96% 95%       Patient was given the following medications:  Medications   dilTIAZem (CARDIZEM) 125 mg in dextrose 5% 125 mL infusion (premix) (5 mg/hr IntraVENous Rate/Dose Change 3/27/22 2595)   0.9 % sodium chloride bolus (has no administration in time range)   metoprolol tartrate (LOPRESSOR) tablet 50 mg (has no administration in time range)       Unremarkable EKG shows atrial fibrillation patient is on a Cardizem drip patient has a history of paroxysmal atrial fibrillation with the onset this evening patient will be admitted for his atrial fibrillation on a Cardizem drip    The patient tolerated their visit well. Thepatient and / or the family were informed of the results of any tests, a time was given to answer questions. FINAL IMPRESSION      1. Paroxysmal atrial fibrillation ([facility] [address]        DISPOSITION/PLAN   DISPOSITION Decision To Admit 03/27/2022 02:51:51 AM      PATIENT REFERRED TO:  No follow-up provider specified.     DISCHARGE MEDICATIONS:  New Prescriptions    No medications on file       DISCONTINUED MEDICATIONS:  Discontinued Medications    No medications on file              (Please note that portions of this note were completed with a voice recognition program.  Efforts were made to edit the dictations but occasionally words aremis-transcribed.)    Yessy Aguilar MD (electronically signed)          Yessy Aguilar MD  03/27/22 7990

## 2022-03-27 NOTE — CONSULTS
[facility]  [phone]      Chief Complaint   Patient presents with    Atrial Fibrillation     Patient in by [facility] from home. States has a hx of a-fib. Patient states he can \"feel his heart skipping and when it skips really hard it takes my breath away. \" Given 0.4 nitro and 324 asa by squad. Reason for consult: Atrial fibrillation with rapid ventricular response    History of Present Illness:  Sarah Aquino is a 32 y.o. patient who presented to the hospital with complaints of palpitations. I have been asked to provide consultation regarding further management and testing. He was found in ER to have AFRVR with rates 150ish. He had a big alcohol binge the night before last.  He states he woke from sleeping last night and new he was in AF as he felt the same way he usually does. He has never needed a cardiversion. He notes he has had other similar episodes when not drinking that occur sometimes while working in [employer] where he has to do a lot of climbing and crawling. He still feels palpitations now despite HR control. His most recent admission for atrial fibrillation with rapid ventricular response in 1/2021 that occurred after binge drinking. He saw EP at that time. He had already carried a diagnosis of atrial fib and was supposed to be on metoprolol, but had run out of it. His CHADS-VASc score was calculated at zero, so he was sent home in atrial fib with controlled rate and without anticoagulation, on a new Rx for diltiazem and with advise to abstain from alcohol. He apparently never ended up getting the diltiazem but is compliant with his metoprolol. Past Medical History:  Atrial fibrillation  Binge drinking    Surgical History:   has a past surgical history that includes Hand surgery and Tympanostomy tube placement. Social History:   reports that he has quit smoking. He started smoking about 16 months ago. He has a 2.00 pack-year smoking history.  He uses smokeless tobacco. He reports current alcohol use. He reports current drug use. Drug: Marijuana Osvaldo Meckel). Family History:  No family history of premature coronary artery disease, aortic disease, or valve disease. Home Medications:  Were reviewed and are listed in nursing record. and/or listed below  Prior to Admission medications    Medication Sig Start Date End Date Taking?  Authorizing Provider   metoprolol tartrate (LOPRESSOR) 50 MG tablet TAKE ONE TABLET BY MOUTH TWICE A DAY 1/11/22   LC Grimm - CNP        Current Medications:  Current Facility-Administered Medications   Medication Dose Route Frequency Provider Last Rate Last Admin    dilTIAZem (CARDIZEM) 125 mg in dextrose 5% 125 mL infusion (premix)  2.5-15 mg/hr IntraVENous Continuous Min Rockwell MD 5 mL/hr at 03/27/22 2672 5 mg/hr at 03/27/22 1708    metoprolol tartrate (LOPRESSOR) tablet 50 mg  50 mg Oral BID Raoul Balderas MD   50 mg at 03/27/22 0433    sodium chloride flush 0.9 % injection 5-40 mL  5-40 mL IntraVENous 2 times per day Raoul Balderas MD        sodium chloride flush 0.9 % injection 5-40 mL  5-40 mL IntraVENous PRN Raoul Balderas MD        0.9 % sodium chloride infusion  25 mL IntraVENous PRN Raoul Balderas MD        ondansetron (ZOFRAN-ODT) disintegrating tablet 4 mg  4 mg Oral Q8H PRN Raoul Balderas MD        Or    ondansetron (ZOFRAN) injection 4 mg  4 mg IntraVENous Q6H PRN Raoul Balderas MD        polyethylene glycol (GLYCOLAX) packet 17 g  17 g Oral Daily PRN Raoul Balderas MD        acetaminophen (TYLENOL) tablet 650 mg  650 mg Oral Q6H PRN Raoul Balderas MD   650 mg at 03/27/22 0559    Or    acetaminophen (TYLENOL) suppository 650 mg  650 mg Rectal Q6H PRN Raoul Balderas MD        enoxaparin (LOVENOX) injection 40 mg  40 mg SubCUTAneous Daily Raoul Balderas MD        aspirin chewable tablet 81 mg  81 mg Oral Daily Raoul Balderas MD            Allergies:  Patient has no known allergies. Review of Systems:     · Constitutional: there has been no unanticipated weight loss. There's been no change in energy level, sleep pattern, or activity level. · Eyes: No visual changes or diplopia. No scleral icterus. · ENT: No Headaches, hearing loss or vertigo. No mouth sores or sore throat. · Cardiovascular: Reviewed in HPI  · Respiratory: No cough or wheezing, no sputum production. No hematemesis. · Gastrointestinal: No abdominal pain, appetite loss, blood in stools. No change in bowel or bladder habits. · Genitourinary: No dysuria, trouble voiding, or hematuria. · Musculoskeletal:  No gait disturbance, weakness or joint complaints. · Integumentary: No rash or pruritis. · Neurological: No headache, diplopia, change in muscle strength, numbness or tingling. No change in gait, balance, coordination, mood, affect, memory, mentation, behavior. · Psychiatric: No anxiety, no depression. · Endocrine: No malaise, fatigue or temperature intolerance. No excessive thirst, fluid intake, or urination. No tremor. · Hematologic/Lymphatic: No abnormal bruising or bleeding, blood clots or swollen lymph nodes. · Allergic/Immunologic: No nasal congestion or hives.   ·     Physical Examination:    Vitals:    03/27/22 0915   BP: 121/77   Pulse: 93   Resp: 14   Temp: 97.4 °F (36.3 °C)   SpO2: 95%    Weight: 250 lb 6.4 oz (113.6 kg)         General Appearance:  Alert, cooperative, no distress, appears stated age   Head:  Normocephalic, without obvious abnormality, atraumatic   Eyes:  PERRL, conjunctiva/corneas clear       Nose: Nares normal, no drainage or sinus tenderness   Throat: Lips, mucosa, and tongue normal   Neck: Supple, symmetrical, trachea midline, no adenopathy, thyroid: not enlarged, symmetric, no tenderness/mass/nodules, no carotid bruit or JVD       Lungs:   Clear to auscultation bilaterally, respirations unlabored   Chest Wall:  No tenderness or deformity   Heart:  Iregular rate and rhythm, S1 variable, S2 normal, no murmur, rub or gallop   Abdomen:   Soft, non-tender, bowel sounds active all four quadrants,  no masses, no organomegaly           Extremities: Extremities normal, atraumatic, no cyanosis or edema   Pulses: 2+ and symmetric   Skin: Skin color, texture, turgor normal, no rashes or lesions   Psych: Normal mood and affect   Neurologic: Normal gross motor and sensory exam.         Labs  Lab Results   Component Value Date    PROBNP 50 03/27/2022    PROBNP 617 01/07/2021       No results found for: CHOL, TRIG, HDL, LDLCALC, LABVLDL      CBC:   Lab Results   Component Value Date    WBC 9.0 03/27/2022    RBC 5.04 03/27/2022    HGB 16.4 03/27/2022    HCT 46.8 03/27/2022    MCV 92.7 03/27/2022    RDW 13.8 03/27/2022     03/27/2022     CMP:    Lab Results   Component Value Date     03/27/2022    K 4.7 03/27/2022    K 3.7 01/07/2021     03/27/2022    CO2 24 03/27/2022    BUN 13 03/27/2022    CREATININE 1.0 03/27/2022    GFRAA >60 03/27/2022    AGRATIO 1.6 03/27/2022    LABGLOM >60 03/27/2022    GLUCOSE 96 03/27/2022    PROT 7.2 03/27/2022    CALCIUM 9.6 03/27/2022    BILITOT 0.4 03/27/2022    ALKPHOS 85 03/27/2022    AST 32 03/27/2022    ALT 24 03/27/2022     PT/INR:  No results found for: PTINR  Lab Results   Component Value Date    TROPONINI <0.01 03/27/2022       EKG:  I have reviewed EKG with the following interpretation:  Impression:   Atrial fibrillation with rapid ventricular response    Echo: 4/2020 - normal LVEF, overall normal  Stress: 4/2020 - normal stress echo  Cath: none  MRI/EP/Other:     Old notes reviewed  Telemetry reviewd  Ekg personally reviewed  Chest xray personally reviewed  Echo, and   Medications and labs reviewed    [id number] Score for Atrial Fibrillation Stroke Risk   Risk   Factors  Component Value   C CHF No 0   H HTN No 0   A2 Age >= 75 No,  (32 y.o.) 0   D DM No 0   S2 Prior Stroke/TIA No 0   V Vascular Disease No 0   A Age 74-69 No,  (35 y.o.) 0   Sc Sex male 0    [id number]  Score  0   Score last updated 3/27/22 8:22 AM EDT    Click here for a link to the UpToDate guideline \"Atrial Fibrillation: Anticoagulation therapy to prevent embolization    Disclaimer: Risk Score calculation is dependent on accuracy of patient problem list and past encounter diagnosis. Moderate complexity/medical decision making due to extensive data review, extensive history review, independent review of data  Moderate risk due to acute illness, evaluation of drug-drug interactions, medication management and diagnostic interventions    Assessment  Patient Active Problem List   Diagnosis    Laceration of hand, right    Paroxysmal atrial fibrillation (HCC)    Atrial fibrillation with RVR (HCC)    Chest pain    Low phosphate levels    Alcohol abuse    Atrial fibrillation with rapid ventricular response (HCC)           Assessment/Plan:  Principal Problem:    Atrial fibrillation with rapid ventricular response (HCC)  Binge drinking disorder    Recs  1 CHADS2-VASc = 0, no need for anticoagulation  2 Stop dilt drip, start dilt  mg daily  3. Continue metoprolol  4. Patient advised to abstain from drinking  5. Patient to call Dr Thu Gusman office tomorrow to arrange follow-up; given recurrent symptoms when not binge drinking he may potentially benefit from an ablation or rhythm control strategy     Ok to d/c home today    Tobacco and alcohol use were discussed with the patient and educated on the negative effects. I have asked the patient to not utilize these agents. Thank you for allowing to us to participate in the care or Saurabh Barnes. Further evaluation will be based upon the patient's clinical course and testing results. All questions and concerns were addressed to the patient/family. Alternatives to my treatment were discussed. The note was completed using EMR.  Every effort was made to ensure accuracy; however, inadvertent computerized transcription errors may be present.       Gabriella Ross MD, MD 3/27/2022 9:36 AM

## 2022-03-27 NOTE — ED NOTES
This RN gave report to RN on 3A. Cardizem infusing upon transfer. Pt placed on tele. VS. No symptoms of distress noted.  Belongings with pt to the floor      Jocy Damon RN  03/27/22 6660

## 2022-03-27 NOTE — PROGRESS NOTES
Data- discharge order received, pt verbalized agreement to discharge, disposition to previous residence, no needs for HHC/DME. Action- discharge instructions prepared and given to [employer], pt verbalized understanding. Medication information packet given r/t NEW and/or CHANGED prescriptions emphasizing name/purpose/side effects, pt verbalized understanding. Discharge instruction summary: Diet- General, Activity- as tolerated, Primary Care Physician as followsRachel Ashby [phone] f/u appointment to be scheduled at pt's prefrence, pt instructed to follow up with Cardiology-Dr Juan Diego Zaman ASAP after discharge. Pt states he is calling tomorrow AM.      1. WEIGHT: Admit Weight: 250 lb 6.4 oz (113.6 kg) (03/27/22 0532)        Today  Weight: 250 lb 6.4 oz (113.6 kg) (03/27/22 0532)       2. O2 SAT.: SpO2: 95 % (03/27/22 0915)    Response- Pt belongings gathered, IV removed. Disposition is home (no HHC/DME needs), transported to [facility] independently w/ belongings, no complications.

## 2022-03-27 NOTE — DISCHARGE SUMMARY
[address] [facility] DISCHARGE SUMMARY    Patient Demographics    PatientCalos Smith Has  Date of Birth. 1990  MRN. [id number]     Primary care provider. Crescencio Phan  (Tel: [phone])    Admit date: 3/26/2022    Discharge date (blank if same as Note Date): Note Date: 3/27/2022     Reason for Hospitalization. Chief Complaint   Patient presents with    Atrial Fibrillation     Patient in by [facility] from home. States has a hx of a-fib. Patient states he can \"feel his heart skipping and when it skips really hard it takes my breath away. \" Given 0.4 nitro and 324 asa by squad. Significant Findings. Principal Problem:    Atrial fibrillation with rapid ventricular response (HCC)  Resolved Problems:    * No resolved hospital problems. *       Problems and results from this hospitalization that need follow up. 1. PAF. Follow up with cardiology and EP for consideration for ablation. Significant test results and incidental findings. CXR 3/27/2022:  No airspace disease by radiograph.           Invasive procedures and treatments. 1. None     Problem-based Hospital Course. Patient is a 31 yo male with hx PAF and HTN. He presented to ER with c/o palpitations which woke him from sleep. He took an extra dose of metoprolol without improvement. Patient was found to be in atrial fib with RVR, rate 150s and started on cardizem drip. He had binged on ETOH a couple nights earlier. D-dimer < 200, low suspicion for PE. He was evaluated by cardiology and transitioned to oral diltiazem. HR was well controlled. He has been ambulatory in halls and eager for DC home. He will follow with cardiology as out-patient for consideration for ablation. [id number] 0, no indication for anticoagulation. Consults. IP CONSULT TO CARDIOLOGY    Physical examination on discharge day.    /77   Pulse 93   Temp 97.4 °F (36.3 °C) (Oral)   Resp 14   Ht 6' 2\" (1.88 m)   Wt 250 lb 6.4 oz (113.6 kg)   SpO2 95%   BMI 32.15 kg/m²   General appearance. Alert. Looks comfortable. HEENT. Sclera clear. Moist mucus membranes. Cardiovascular. Regular rate and rhythm, normal S1, S2. No murmur. Respiratory. Not using accessory muscles. Clear to auscultation bilaterally, no wheeze. Gastrointestinal. Abdomen soft, non-tender, not distended, normal bowel sounds  Neurology. Facial symmetry. No speech deficits. Moving all extremities equally. Extremities. No edema in lower extremities. Skin. Warm, dry, normal turgor    Condition at time of discharge stable    Medication instructions provided to patient at discharge. Medication List      START taking these medications    dilTIAZem 180 MG extended release capsule  Commonly known as: CARDIZEM CD  Take 1 capsule by mouth daily  Notes to patient: Use: antihypertensive/calcium channel blocker  Side effects: lightheadedness, weakness & headache        CONTINUE taking these medications    metoprolol tartrate 50 MG tablet  Commonly known as: LOPRESSOR  TAKE ONE TABLET BY MOUTH TWICE A DAY  Notes to patient: Use: Beta Blocker used to treat high blood pressure, chest pain (angina) and heart failure. May low risk of death after having a heart attacks  Side effects: Tiredness, dry mouth, loose stools, indigestion, dizziness & blurred vision. Where to Get Your Medications      These medications were sent to [facility] [address], [address] [facility]    Phone: [phone]   · dilTIAZem 180 MG extended release capsule         Discharge recommendations given to patient. Follow Up. Cardiology in 1-2 weeks   Disposition. home  Activity. activity as tolerated  Diet: ADULT DIET; Regular      Spent > 30 minutes in discharge process.     Signed:  LC Byrd CNP     3/27/2022 1:39 PM

## 2022-03-27 NOTE — H&P
Hospital Medicine History and Physical    3/27/2022    Date of Admission: 3/27/2022    Date of Service: Pt seen/examined on 3/27/2022 and admitted to observation. Assessment/plan:  1. Atrial fibrillation with rapid ventricular response. Patient is currently on 2.5 mg/h of IV Cardizem infusion; give 50 mg of metoprolol twice daily, first dose now, and wean off Cardizem. Start aspirin. Cardiology consult. 2. Right-sided chest pain and shortness of breath. Check D-dimer; if elevated, obtain CT-PE protocol. Maintain on telemetry. Chest discomfort could be secondary to rapid heart rate. Activities: Up with assist  Prophylaxis: Subcutaneous Lovenox  Code status: Full code    ==========================================================  Chief complaint:  Chief Complaint   Patient presents with    Atrial Fibrillation     Patient in by [facility] from home. States has a hx of a-fib. Patient states he can \"feel his heart skipping and when it skips really hard it takes my breath away. \" Given 0.4 nitro and 324 asa by squad. History of Presenting Illness: This is a pleasant 32 y.o. male with history of paroxysmal atrial fibrillation, on metoprolol, who presents to the emergency room with complaints of heart skipping beats, onset since around 11 PM last night. He reports right-sided chest discomfort, mild, with associated shortness of breath. No fever or chills. Troponin is normal.  D-dimer is currently pending. Chest x-ray is unremarkable. EKG with atrial fibrillation, rate 114 beats per minutes with no acute ST/T changes. He has been started on Cardizem infusion in the emergency room. Patient is being admitted for further evaluation.     Past Medical History:      Diagnosis Date    Heart palpitations        Past Surgical History:      Procedure Laterality Date    HAND SURGERY      LEFT - ORIF Boxer's Fracture    TYMPANOSTOMY TUBE PLACEMENT         Medications (prior to admission):  Prior to Admission medications    Medication Sig Start Date End Date Taking? Authorizing Provider   metoprolol tartrate (LOPRESSOR) 50 MG tablet TAKE ONE TABLET BY MOUTH TWICE A DAY 1/11/22   LC Kong CNP       Allergy(ies):  Patient has no known allergies. Social History:  TOBACCO:  reports that he has quit smoking. He started smoking about 16 months ago. He has a 2.00 pack-year smoking history. He uses smokeless tobacco.  ETOH:  reports current alcohol use. Family History:      Problem Relation Age of Onset    Diabetes Mother     Arthritis Father     Asthma Father        Review of Systems:  Pertinent positives are listed in HPI. At least 10-point ROS reviewed and were negative. Vitals and physical examination:  /71   Pulse 92   Temp 98 °F (36.7 °C) (Oral)   Resp 17   SpO2 95%   Gen/overall appearance: Not in acute distress. Alert. Oriented x3. Head: Normocephalic, atraumatic  Eyes: EOMI, good acuity  ENT: Oral mucosa moist  Neck: No JVD, thyromegaly  CVS: Nml S1S2, no MRG. Irregular. Pulm: Clear bilaterally. No crackles/wheezes  Gastrointestinal: Soft, NT/ND, +BS  Musculoskeletal: No edema. Warm  Neuro: No focal deficit. Moves extremity spontaneously. Psychiatry: Appropriate affect. Not agitated. Skin: Warm, dry with normal turgor. No rash  Capillary refill: Brisk,< 3 seconds   Peripheral Pulses: +2 palpable, equal bilaterally       Labs/imaging/EKG:  CBC:   Recent Labs     03/27/22 0052   WBC 9.0   HGB 16.4        BMP:    Recent Labs     03/27/22 0052      K 4.7      CO2 24   BUN 13   CREATININE 1.0   GLUCOSE 96     Hepatic:   Recent Labs     03/27/22 0052   AST 32   ALT 24   BILITOT 0.4   ALKPHOS 85       XR CHEST PORTABLE    Result Date: 3/27/2022  EXAMINATION: ONE XRAY VIEW OF THE CHEST 3/27/2022 12:53 am COMPARISON: Chest x-ray 01/07/2021.  HISTORY: ORDERING SYSTEM PROVIDED HISTORY: cp TECHNOLOGIST PROVIDED HISTORY: Reason for exam:->cp FINDINGS: Cardiac silhouette is within normal limits in size. Mediastinal contours are otherwise suboptimally evaluated due to rotated projection. Lungs demonstrate no focal consolidation or pleural effusion. No pneumothorax appreciated on this projection. No airspace disease by radiograph. EKG: Atrial fibrillation, rate 114 beats per minutes. No acute ST/T changes. I reviewed EKG. Discussed with ER physician.       Thank you Tatieddi Harrisflor for the opportunity to be involved in this patient's care.    -----------------------------  Rodrigo Frank MD  [facility] hospitalist

## 2022-03-28 ENCOUNTER — TELEPHONE (OUTPATIENT)
Dept: CARDIOLOGY CLINIC | Age: 32
End: 2022-03-28

## 2022-03-28 NOTE — TELEPHONE ENCOUNTER
Pt went to the ER for AFIB, they told to scheduled appt with [facility] this week and with [facility] for possible ablation pt is scheduled with [facility] 3/31 does the pt need to get seen sooner? There are no openings with [facility], is there a date and time pt can get scheduled?     Pls advise thank you     Shannan Cavazos   [phone]

## 2022-03-30 PROBLEM — I10 HYPERTENSION: Status: ACTIVE | Noted: 2022-03-30

## 2022-03-30 NOTE — PROGRESS NOTES
[facility]   Electrophysiology Follow up   Date: 3/30/2022  I had the privilege of visiting Ervin Rodrigues in the office. CC: ***   HPI: Ervin Rodrigues is a 32 y.o. male  With past medical history PMH paroxysmal atrial fibrillation with RVR, chest pain. HTN  Seen in consult 01/08/2021 ED Admit AFib w ith RVR in the setting of binge drinking and not taking his metoprolol. 03/17/2022 presented to ED via EMS with c/o feeling his heart skipping. Had 1/2 Fifth of whiskey the night before. Found to be in afib with RVR. Treated with cardizem drip. Dicharged on PO cardizem and metoprolol tartrate. He saw  [facility] in follow up and was referred back to EP . Assessment and plan:   Atrial fibrillation with RVR    - EKG today    - continue cardizem 180 mg daily   - continue metoprolol tartrate 50 mg BID    - in the setting of binge drinking and being of metoprolol   - Patient has a [id number] Score of 1 ( HTN)     - not on AC at this time      - Treatment options including cardioversion, rate control strategy, antiarrhythmics, anticoagulation and possible ablation were discussed with patient. Risks, benefits and alternative of each treatment options were explained. All questions answered   - Discussed in detail about the risk factors, pathophysiology, and treatment options including life style modifications for atrial fibrillation. · Eat heart healthy diet  · Minimize alcohol intake  · Minimize caffeine and soda   · Keep your blood pressure under control. · Keep your blood sugar under control.    · Stop smoking  · Be active, keep your body weight optimal  · Exercise on a regular basis  · Manage your stress   · If you snore, get evaluated for sleep apnea  · Be aware of the symptoms of stroke      HTN  -Controlled/Not well controlled***  -BP goal <130/80  -Home BP monitoring encouraged, printed information provided on how to accurately measure BP at home.  -Counseled to follow a low salt diet to assure blood pressure remains controlled for cardiovascular risk factor modification.   -The patient is counseled to get regular exercise 3-5 times per week and maintain a healthy weight reduce cardiovascular risk factors. - continue metoprolol tartrate       Alcohol abuse     - discussed correlation between alcohol and atrial fibrillation and advised to obstain        Patient Active Problem List    Diagnosis Date Noted    Atrial fibrillation with rapid ventricular response ([facility] 03/27/2022    Chest pain     Low phosphate levels     Alcohol abuse     Atrial fibrillation with RVR ([facility] [facility] 01/07/2021    Paroxysmal atrial fibrillation ([facility] 03/11/2019    Laceration of hand, right 04/12/2011     Diagnostic studies:   ECG 3/30/22  ***SR/AFIB  *** QTcH, ***QRS    Echo 04/01/2020  Summary   Normal left ventricle size, wall thickness and systolic function with an   estimated ejection fraction of 60%. No regional wall motion abnormalities   are seen. E/e\"= 6. Diastolic filling parameters suggests normal diastolic function. Trivial mitral regurgitation. IVC size is normal (<2.1cm) and collapses > 50% with respiration consistent   with normal RA pressure (3mmHg). Echo stress 04/01/2020   Summary   Normal stress echocardiogram study. 91% of predicted HR. No symptoms with exercise. I independently reviewed the cardiac diagnostic studies, ECG and relevant imaging studies. Lab Results   Component Value Date    LVEF 50 04/01/2020     Lab Results   Component Value Date    TSH 0.83 01/07/2021         Physical Examination:  There were no vitals filed for this visit. Wt Readings from Last 3 Encounters:   03/27/22 250 lb 6.4 oz (113.6 kg)   01/08/21 256 lb (116.1 kg)   03/25/20 259 lb (117.5 kg)       ***     Review of System:  [x] Full ROS obtained and negative except as mentioned in HPI    Prior to Admission medications    Medication Sig Start Date End Date Taking?  Authorizing Provider   dilTIAZem (CARDIZEVIVI CD) 180 MG extended release capsule Take 1 capsule by mouth daily 3/28/22   Guerrero Mccabe, APRN - CNP   metoprolol tartrate (LOPRESSOR) 50 MG tablet TAKE ONE TABLET BY MOUTH TWICE A DAY 1/11/22   LC Boston - CNP       No Known Allergies    Social History:  Reviewed. reports that he has quit smoking. He started smoking about 16 months ago. He has a 2.00 pack-year smoking history. He uses smokeless tobacco. He reports current alcohol use of about 15.0 standard drinks of alcohol per week. He reports current drug use. Drug: Marijuana Karlie Kales). Family History:  Reviewed. Reviewed. No family history of SCD. Relevant and available labs, and cardiovascular diagnostics reviewed. Reviewed. I independently reviewed relevant and available cardiac diagnostic tests ECG, CXR, Echo, Stress test, Device interrogation, Holter, CT scan.   *** Outside medical records via Care everywhere reviewed and summarized in H&P above. *** Complex medical condition with multiple medical problems affecting prognosis and outcome of EP interventions    ***   - The patient is counseled to follow a low salt diet to assure blood pressure remains controlled for cardiovascular risk factor modification.   - The patient is counseled to avoid excess caffeine, and energy drinks as this may exacerbated ectopy and arrhythmia. - The patient is counseled to get regular exercise 3-5 times per week to control cardiovascular risk factors. - The patient is counseled to lose weigt to control cardiovascular risk factors. - The patient is counseled to avoid tobacco use. All questions and concerns were addressed to the patient/family. Alternatives to my treatment were discussed. I have discussed the above stated plan and the patient verbalized understanding and agreed with the plan. ***   NOTE: This report was transcribed using voice recognition software.  Every effort was made to ensure accuracy, however, inadvertent computerized transcription errors may be present.      Gregg Quintana MD, MPH  [place] [address]   Office: [phone]  Fax: [phone]

## 2022-03-31 ENCOUNTER — TELEPHONE (OUTPATIENT)
Dept: CARDIOLOGY CLINIC | Age: 32
End: 2022-03-31

## 2022-04-05 NOTE — PROGRESS NOTES
[facility]   Electrophysiology Consultation   Date: 4/8/2022  Reason for Consultation: Afib  Consult Requesting Physician:   Chief Complaint   Patient presents with    New Patient     no cardiac complaints at this time ; patient stopped taking Diltiazem due to having Migraines        CC: Afib  HPI: Chip Degree is a 32 y.o. male With past medical history PMH paroxysmal atrial fibrillation with RVR, chest pain. HTN  Seen in consult 01/08/2021 ED Admit AFib w ith RVR in the setting of binge drinking and not taking his metoprolol. 03/17/2022 presented to ED via EMS with c/o feeling his heart skipping. Had 1/2 Fifth of whiskey the night before. Found to be in afib with RVR. Treated with cardizem drip. Dicharged on PO cardizem and metoprolol tartrate. He saw Dr Nazanin Spencer in follow up and was referred back to EP . Dorota Calix presents to the office as a new patient. He states he is symptomatic with his afib episodes and has remained in normal rhythm. States his last episode of afib was d/t heavy drinking. Advised him to stop drinking alcohol as this is a trigger for his atrial fibrillation. He said this past episode made him feel dizzy like he may pass out. Advised that he should completely abstain from alcohol. Assessment and plan:   Atrial fibrillation with RVR    - EKG today declined   - Stopped cardizem 180 mg daily d/t headaches    - continue metoprolol tartrate 50 mg BID    - in the setting of binge drinking and being off metoprolol   - Strongly encouraged complete cessation of alcohol    - Patient has a [id number] Score of 1 ( HTN)     - not on AC at this time   - Treatment options including cardioversion, rate control strategy, antiarrhythmics, anticoagulation and possible ablation were discussed with patient. Risks, benefits and alternative of each treatment options were explained.  All questions answered   - Discussed in detail about the risk factors, pathophysiology, and treatment options including life style modifications for atrial fibrillation. His A. fib episodes are clearly related to alcohol consumption. He is going to avoid alcohol from now on and therefore until he has more episodes, no further action is necessary. If he has recurrence of A. fib without alcohol or if he wants to have his A. fib ablation, we can proceed with that. HTN  -Controlled: 118/74  -BP goal <130/80  -Home BP monitoring encouraged, printed information provided on how to accurately measure BP at home.  -Counseled to follow a low salt diet to assure blood pressure remains controlled for cardiovascular risk factor modification.   -The patient is counseled to get regular exercise 3-5 times per week and maintain a healthy weight reduce cardiovascular risk factors. Alcohol use    Patient is planning on stopping drinking alcohol completely. Plan:   Abstain from alcohol   1 year follow up     Patient Active Problem List    Diagnosis Date Noted    Hypertension 03/30/2022    Atrial fibrillation with rapid ventricular response ([address] 03/27/2022    Chest pain     Low phosphate levels     Alcohol abuse     [facility]VR ([facility]) 01/07/2021    Paroxysmal atrial fibrillation ([facility] [address] 03/11/2019    Laceration of hand, right 04/12/2011     Diagnostic studies:   ECG 4/8/22  Declined today    Echo 04/01/2020  Summary   Normal left ventricle size, wall thickness and systolic function with an   estimated ejection fraction of 60%. No regional wall motion abnormalities   are seen. E/e\"= 6. Diastolic filling parameters suggests normal diastolic function. Trivial mitral regurgitation. IVC size is normal (<2.1cm) and collapses > 50% with respiration consistent   with normal RA pressure (3mmHg). Echo stress 04/01/2020   Summary   Normal stress echocardiogram study. 91% of predicted HR.    No symptoms with exercise      I independently reviewed the cardiac diagnostic studies, ECG and relevant imaging studies. Lab Results   Component Value Date    LVEF 50 04/01/2020     Lab Results   Component Value Date    TSH 0.83 01/07/2021       Physical Examination:  Vitals:    04/07/22 1504   BP: 118/74   Pulse: 80   SpO2: 96%      Wt Readings from Last 3 Encounters:   04/07/22 256 lb 12.8 oz (116.5 kg)   03/27/22 250 lb 6.4 oz (113.6 kg)   01/08/21 256 lb (116.1 kg)       · Constitutional: Oriented. No distress. · Head: Normocephalic and atraumatic. · Mouth/Throat: Oropharynx is clear and moist.   · Eyes: Conjunctivae normal. EOM are normal.   · Neck: Neck supple. No rigidity. No JVD present. · Cardiovascular: Normal rate, regular rhythm, S1&S2. · Pulmonary/Chest: Bilateral respiratory sounds. No wheezes, No rhonchi. · Abdominal: Soft. Bowel sounds present. No distension, No tenderness. · Musculoskeletal: No tenderness. No edema    · Lymphadenopathy: Has no cervical adenopathy. · Neurological: Alert and oriented. Cranial nerve appears intact, No Gross deficit   · Skin: Skin is warm and dry. No rash noted. · Psychiatric: Has a normal behavior       Review of System:  [x] Full ROS obtained and negative except as mentioned in HPI    Prior to Admission medications    Medication Sig Start Date End Date Taking? Authorizing Provider   metoprolol tartrate (LOPRESSOR) 50 MG tablet TAKE ONE TABLET BY MOUTH TWICE A DAY 4/7/22  Yes Juli Rios MD       Past Medical History:   Diagnosis Date    Heart palpitations     Hypertension     MATY (obstructive sleep apnea)     suspected        Past Surgical History:   Procedure Laterality Date    FRACTURE SURGERY      HAND SURGERY      LEFT - ORIF Boxer's Fracture    TYMPANOSTOMY TUBE PLACEMENT         No Known Allergies    Social History:  Reviewed. reports that he has quit smoking. He started smoking about 17 months ago. He has a 2.00 pack-year smoking history.  He uses smokeless tobacco. He reports current alcohol use of about 15.0 standard drinks of alcohol per week. He reports current drug use. Drug: Marijuana Myrtis Regulus). Family History:  Reviewed. Reviewed. No family history of SCD. Relevant and available labs, and cardiovascular diagnostics reviewed. Reviewed. I independently reviewed all cardiac tracing. I independently reviewed relevant and available cardiac diagnostic tests ECG, CXR, Echo, Stress test, Device interrogation, Holter, CT scan. Outside medical records via Care everywhere reviewed and summarized in H&P above. Complex medical condition with multiple medical problems affecting prognosis and outcome of EP interventions       - The patient is counseled to follow a low salt diet to assure blood pressure remains controlled for cardiovascular risk factor modification.   - The patient is counseled to avoid excess caffeine, and energy drinks as this may exacerbated ectopy and arrhythmia. - The patient is counseled to get regular exercise 3-5 times per week to control cardiovascular risk factors. All questions and concerns were addressed to the patient/family. Alternatives to my treatment were discussed. I have discussed the above stated plan and the patient verbalized understanding and agreed with the plan. Scribe attestation: This note was scribed in the presence of Desean Moreno MD by Alexandra Rojas RN    I, Dr. Desean Moreno personally performed the services described in this documentation as scribed by RN in my presence, and it is both accurate and complete.         NOTE: This report was transcribed using voice recognition software. Every effort was made to ensure accuracy, however, inadvertent computerized transcription errors may be present.      Desean Moreno MD, [facility], [address] [place] [address]   Office: [phone]  Fax: [phone]

## 2022-04-07 ENCOUNTER — OFFICE VISIT (OUTPATIENT)
Dept: CARDIOLOGY CLINIC | Age: 32
End: 2022-04-07
Payer: COMMERCIAL

## 2022-04-07 VITALS
OXYGEN SATURATION: 96 % | SYSTOLIC BLOOD PRESSURE: 118 MMHG | HEIGHT: 74 IN | WEIGHT: 256.8 LBS | BODY MASS INDEX: 32.96 KG/M2 | HEART RATE: 80 BPM | DIASTOLIC BLOOD PRESSURE: 74 MMHG

## 2022-04-07 DIAGNOSIS — F10.10 ALCOHOL ABUSE: ICD-10-CM

## 2022-04-07 DIAGNOSIS — I10 HYPERTENSION, UNSPECIFIED TYPE: ICD-10-CM

## 2022-04-07 DIAGNOSIS — I48.91 ATRIAL FIBRILLATION WITH RAPID VENTRICULAR RESPONSE (HCC): Primary | ICD-10-CM

## 2022-04-07 PROCEDURE — 99214 OFFICE O/P EST MOD 30 MIN: CPT | Performed by: INTERNAL MEDICINE

## 2022-04-07 RX ORDER — METOPROLOL TARTRATE 50 MG/1
TABLET, FILM COATED ORAL
Qty: 180 TABLET | Refills: 3 | Status: SHIPPED | OUTPATIENT
Start: 2022-04-07

## 2022-08-01 ENCOUNTER — TELEPHONE (OUTPATIENT)
Dept: CARDIOLOGY CLINIC | Age: 32
End: 2022-08-01

## 2022-08-01 NOTE — TELEPHONE ENCOUNTER
Last OV: 04/07/2022 w/ [facility]    Last Refill: 04/07/2022 #180 w/ 3 refills    Next Appointment: on recall list for appt on 04/07/2023 w/ MXA    Spoke with the pharmacy staff and was advised that rx was received . Spoke with the patient and advised him that rx is on file at the pharmacy and they will be getting ready for the patient .  Call complete

## 2022-08-01 NOTE — TELEPHONE ENCOUNTER
Medication Refill    Medication needing refilled: metoprolol tartrate (LOPRESSOR)      Dosage of the medication: 50mg    How are you taking this medication (QD, BID, TID, QID, PRN):  : TAKE ONE TABLET BY MOUTH TWICE A DAY    30 or 90 day supply called in: 180    When will you run out of your medication:    Which Pharmacy are we sending the medication to?:  [facility] [id number] [place], [place] - [address] [phone] [facility] [phone]   [address] [place] [address]   Phone:  [phone]  Fax:  [phone]

## 2022-10-31 ENCOUNTER — TELEPHONE (OUTPATIENT)
Dept: CARDIOLOGY CLINIC | Age: 32
End: 2022-10-31

## 2022-10-31 NOTE — TELEPHONE ENCOUNTER
Medication Refill      Medication needing refilled:metoprolol tartrate (LOPRESSOR) 50 MG tablet       Dosage of the medication:    How are you taking this medication (QD, BID, TID, QID, PRN): BID    30 or 90 day supply called in:90    When will you run out of your medication: today    Which Pharmacy are we sending the medication to?:    [facility] [id number] [address] [phone]   [address] [place] [address]   Phone:  [phone]  Fax:  [phone]

## 2023-02-23 ENCOUNTER — TELEPHONE (OUTPATIENT)
Dept: CARDIOLOGY CLINIC | Age: 33
End: 2023-02-23

## 2023-02-23 PROBLEM — I48.91 ATRIAL FIBRILLATION WITH RVR (HCC): Status: RESOLVED | Noted: 2021-01-07 | Resolved: 2023-02-23

## 2023-02-23 PROBLEM — I48.91 ATRIAL FIBRILLATION WITH RAPID VENTRICULAR RESPONSE (HCC): Status: RESOLVED | Noted: 2022-03-27 | Resolved: 2023-02-23

## 2023-02-23 NOTE — PROGRESS NOTES
[facility]   Electrophysiology  Lesvia Cameron, APRN-CNP  Attending EP: Dr. García Herrera    Date: 2/27/2023  I had the privilege of visiting Radames Hernandez in the office. Chief Complaint:   Chief Complaint   Patient presents with    Atrial Fibrillation     History of Present Illness: History obtained from patient and medical record. Radames Hernandez is 28 y.o. male with a past medical history of PAF, HTN, alcohol use, obesity.    -Interval history: Today, Radames Hernandez is being seen for urgent follow up. He is in sinus rhythm with stable BP. He states he has been feeling his heart skip beats for the past week. He thinks he is in afib. He admits he did drink heavily the past weekend that triggered his palpitations. He feels his heart \"skip and restart\". He is very concerned about his symptoms. He states that his symptoms are better today, then they have been. He works on [employer] symptoms. He had dramatically cut back on his alcohol intake. Denies having chest pain, shortness of breath, orthopnea/PND, cough, or dizziness at the time of this visit. With regard to medication therapy the patient has been compliant with prescribed regimen. They have tolerated therapy to date. Allergies:  No Known Allergies    Home Medications:  Prior to Visit Medications    Medication Sig Taking? Authorizing Provider   metoprolol tartrate (LOPRESSOR) 50 MG tablet TAKE ONE TABLET BY MOUTH TWICE A DAY Yes Carla Hamilton MD      Past Medical History:  Past Medical History:   Diagnosis Date    Heart palpitations     Hypertension     MATY (obstructive sleep apnea)     suspected     Past Surgical History:    has a past surgical history that includes Hand surgery; Tympanostomy tube placement; and fracture surgery. Social History:  Personally Reviewed. reports that he has quit smoking. His smoking use included cigarettes. He started smoking about 2 years ago. He has a 2.00 pack-year smoking history.  He uses smokeless tobacco. He reports current alcohol use of about 15.0 standard drinks per week. He reports current drug use. Drug: Marijuana Vladimir Mojica). Family History:  Personally Reviewed. family history includes Arrhythmia in his father and sister; Arthritis in his father; Asthma in his father; Atrial Fibrillation in his father; Diabetes in his mother; Hearing Loss in his father; Heart Disease in his sister; High Blood Pressure in his father; High Cholesterol in his mother; Immune Disorder in his father; Other in his sister. Review of Systems:  Constitutional: Negative for fever, night sweats, chills, weight changes, or weakness  Skin: Negative for rash, dry skin, pruritus, bruising, bleeding, blood clots, or changes in skin pigment  HEENT: Negative for vision changes, ringing in the ears, sore throat, dysphagia, or swollen lymph nodes  Respiratory: Reviewed in HPI  Cardiovascular: Reviewed in HPI  Gastrointestinal: Negative for abdominal pain, N/V/D, constipation, or black/tarry stools  Genito-Urinary: Negative for dysuria, incontinence, urgency, or hematuria  Musculoskeletal: Negative for joint swelling, muscle pain, or injuries  Neurological/Psych: Negative for confusion, seizures, dizziness, headaches, balance issues or TIA-like symptoms. No anxiety, depression, or insomnia    Physical Examination:  Vitals:    02/27/23 1451   BP: 122/82   Pulse: 80   SpO2: 98%      Wt Readings from Last 3 Encounters:   02/27/23 263 lb 12.8 oz (119.7 kg)   04/07/22 256 lb 12.8 oz (116.5 kg)   03/27/22 250 lb 6.4 oz (113.6 kg)     Constitutional: Cooperative and in no apparent distress, and appears well nourished  Skin: Warm and pink; no pallor, cyanosis, bruising, or clubbing  HEENT: Symmetric and normocephalic. PERRL, EOM intact. Conjunctiva pink with clear sclera. Mucus membranes pink and moist. Teeth intact. Thyroid smooth without nodules or goiter  Respiratory: Respirations symmetric and unlabored.  Lungs clear to auscultation bilaterally, no wheezing, rhonchi, or crackles  Cardiovascular:  Regular rate and rhythm. S1/S2 present without murmurs, rubs, or gallops. Peripheral pulses 2+, capillary refill < 3 seconds. No elevation of JVP. No peripheral edema  Gastrointestinal: Abdomen soft and round. Bowel sounds normoactive in all quadrants without tenderness or masses. + obese  Musculoskeletal: Bilateral upper and lower extremity strength 5/5 with full ROM. Neurological/Psych: Awake and orientated to person, place and time. Calm affect, appropriate mood. Pertinent labs, diagnostic, device, and imaging results reviewed as a part of this visit    LABS    CBC:   Lab Results   Component Value Date    WBC 9.0 2022    HGB 16.4 2022    HCT 46.8 2022    MCV 92.7 2022     2022     BMP:   Lab Results   Component Value Date    CREATININE 1.0 2022    BUN 13 2022     2022    K 4.7 2022     2022    CO2 24 2022     CrCl cannot be calculated (Patient's most recent lab result is older than the maximum 180 days allowed. ). Thyroid:   Lab Results   Component Value Date    TSH 0.83 2021     Lipid Panel: No results found for: CHOL, HDL, TRIG  LFTs:  Lab Results   Component Value Date    ALT 24 2022    AST 32 2022    ALKPHOS 85 2022    BILITOT 0.4 2022     Coags:   Lab Results   Component Value Date    PROTIME 13.0 2021    INR 1.12 2021    APTT 29.1 2021       EC2023 (Personally Interpreted)  - NSR. Rate 76, , QTc 386    Stress Echo:    Normal stress echocardiogram study. 91% of predicted HR. No symptoms with exercise. Normal left ventricle size, wall thickness and systolic function with an   estimated ejection fraction of 60%. No regional wall motion abnormalities   are seen. E/e\"= 6. Diastolic filling parameters suggests normal diastolic function. Trivial mitral regurgitation.    IVC size is normal (<2.1cm) and collapses > 50% with respiration consistent   with normal RA pressure (3mmHg). Assessment: 1. Paroxsymal Atrial Fibrillation  - Currently in NSR  - Continue metoprolol 50 mg BID  - [id number] score:1; [id number] Vasc score and anticoagulation discussed. .  ~ No need for anti-coagulation    - Afib risk factors including age, HTN, obesity, inactivity and MATY were discussed with patient. Risk factor modification recommended    2. Palpitation  - Etiology has not established, although based on his symptoms, it sounds related to PAC/PVCs  ~ Differential diagnosis are arrhythmia including SVT, PACs, or PVCs  - No recent labs    - He is currently in sinus rhythm with no ectopy. Will do 7 day holter to correlate symptoms prior to adjusting medication therapy. If his symptoms are related to PAC/PVC and his burden is low, then no need to dramatically change current medication regimen. If his burden is high, then we can consider increasing his BB vs trial on alternate medication. I also encouraged him to look into cafegive for long term monitoring. Also recommend updating labs, including TSH with PCP    3. HTN  - Controlled: Goal <130/80  - Continue current medications  - Encouraged to monitor and log BP readings at home, then bring log to next visit  - Discussed importance of low sodium diet, weight control and exercise    4. Obesity  - Body mass index is 35.78 kg/m². - Complicating assessment and treatment. Placing patient at risk for multiple co-morbidities as well as early death and contributing to the patient's presentation  - Discussed weight loss and patient was encouraged to reduce calorie intake and increase exercise    Plan: Will do 7 day holter  Look into cafegive  Avoid alcohol intake    F/U: Follow-up with EP in 6 months, sooner dependent on monitor results  -Call [facility] at [phone] with any questions    Diet & Exercise:   The patient is counseled to follow a low salt diet to assure blood pressure remains controlled for cardiovascular risk factor modification  The patient is counseled to avoid excess caffeine, and energy drinks as this may exacerbated ectopy and arrhythmia  The patient is counseled to lose weight to control cardiovascular risk factors  Exercise program discussed: To improve overall cardiovascular health, the patient is instructed to increase cardiovascular related activities with a goal of 150 min/week of moderate level activity or 10,000 steps per day. Encouraged to perform as much activity as tolerated    I have addressed the patient's cardiac risk factors and adjusted pharmacologic treatment as needed. In addition, I have reinforced the need for patient directed risk factor modification. I independently reviewed the ECG    All questions and concerns were addressed with the patient. Alternatives to treatment were discussed. Thank you for allowing to us to participate in the care of Duane Coffee    Time  25 minutes spent preparing to see patient including reviewing patient history/prior tests/prior consults, performing a medical exam, counseling and educating patient/family/caregiver, ordering medications/tests/procedures, referring and communicating with PCPs and other pertinent consultants, documenting information in the EMR, independently interpreting results and communicating to family and coordination of patient care.     Herb Rivas, LC-CNP  [facility]   Office: [phone]

## 2023-02-23 NOTE — TELEPHONE ENCOUNTER
Pt called stating that the last three his heart has been skipping beats, pt stated they do not feel bad, they just need to know what they can do?     Pls advise thank you     Ryder Steele  [phone]

## 2023-02-27 ENCOUNTER — OFFICE VISIT (OUTPATIENT)
Dept: CARDIOLOGY CLINIC | Age: 33
End: 2023-02-27
Payer: COMMERCIAL

## 2023-02-27 VITALS
HEIGHT: 72 IN | SYSTOLIC BLOOD PRESSURE: 122 MMHG | DIASTOLIC BLOOD PRESSURE: 82 MMHG | WEIGHT: 263.8 LBS | HEART RATE: 80 BPM | BODY MASS INDEX: 35.73 KG/M2 | OXYGEN SATURATION: 98 %

## 2023-02-27 DIAGNOSIS — R00.2 PALPITATION: ICD-10-CM

## 2023-02-27 DIAGNOSIS — I10 HYPERTENSION, UNSPECIFIED TYPE: ICD-10-CM

## 2023-02-27 DIAGNOSIS — I48.0 PAROXYSMAL ATRIAL FIBRILLATION (HCC): ICD-10-CM

## 2023-02-27 DIAGNOSIS — I48.91 ATRIAL FIBRILLATION WITH RAPID VENTRICULAR RESPONSE (HCC): Primary | ICD-10-CM

## 2023-02-27 DIAGNOSIS — E66.01 CLASS 2 SEVERE OBESITY DUE TO EXCESS CALORIES WITH SERIOUS COMORBIDITY AND BODY MASS INDEX (BMI) OF 35.0 TO 35.9 IN ADULT (HCC): ICD-10-CM

## 2023-02-27 DIAGNOSIS — F10.10 ALCOHOL ABUSE: ICD-10-CM

## 2023-02-27 PROBLEM — E66.812 CLASS 2 SEVERE OBESITY DUE TO EXCESS CALORIES WITH SERIOUS COMORBIDITY AND BODY MASS INDEX (BMI) OF 35.0 TO 35.9 IN ADULT: Status: ACTIVE | Noted: 2023-02-27

## 2023-02-27 PROCEDURE — 3079F DIAST BP 80-89 MM HG: CPT | Performed by: NURSE PRACTITIONER

## 2023-02-27 PROCEDURE — 3074F SYST BP LT 130 MM HG: CPT | Performed by: NURSE PRACTITIONER

## 2023-02-27 PROCEDURE — 93000 ELECTROCARDIOGRAM COMPLETE: CPT | Performed by: NURSE PRACTITIONER

## 2023-02-27 PROCEDURE — 99213 OFFICE O/P EST LOW 20 MIN: CPT | Performed by: NURSE PRACTITIONER

## 2023-02-27 NOTE — PROGRESS NOTES
Placed Bi[employer]el heart monitor on pt and pt was given instructions. Pt verbalized understanding.     Device Name: [id number]

## 2023-03-14 ENCOUNTER — TELEPHONE (OUTPATIENT)
Dept: CARDIOLOGY CLINIC | Age: 33
End: 2023-03-14

## 2023-06-02 ENCOUNTER — TELEPHONE (OUTPATIENT)
Dept: CARDIOLOGY CLINIC | Age: 33
End: 2023-06-02

## 2023-06-02 RX ORDER — METOPROLOL TARTRATE 50 MG/1
TABLET, FILM COATED ORAL
Qty: 180 TABLET | Refills: 3 | Status: SHIPPED | OUTPATIENT
Start: 2023-06-02

## 2023-06-02 NOTE — TELEPHONE ENCOUNTER
Received refill request for metoprolol from [facility] pharmacy.     Last ov: 02/27/2023 NPSR    Last Refill: 04/07/2022 #180 w/ 3 refills    Next appointment: 08/28/2023 NPSR

## 2023-06-02 NOTE — TELEPHONE ENCOUNTER
Medication Refill    Medication needing refilled:  metoprolol tartrate    Dosage of the medication:  50mg    How are you taking this medication (QD, BID, TID, QID, PRN):  TAKE ONE TABLET BY MOUTH TWICE A DAY    30 or 90 day supply called in:  180    When will you run out of your medication:  06/01    Which Pharmacy are we sending the medication to?:    Milind May [id number]   [facility]   Phone:   [phone]   Fax:   [phone]

## 2023-06-02 NOTE — TELEPHONE ENCOUNTER
Patient enquiring about monitor results please advise. Spoke to patient and notified him medication was sent to the pharmacy. He verbalized understanding.

## 2023-06-02 NOTE — TELEPHONE ENCOUNTER
Event monitor with no atrial fibrillation. Brief PAT (longest 6 beats). His symptoms were with sinus rhythm.  Continue current medication regimen and follow up as scheduled

## 2023-08-15 ENCOUNTER — HOSPITAL ENCOUNTER (INPATIENT)
Age: 33
LOS: 1 days | Discharge: HOME | DRG: 309 | End: 2023-08-16
Attending: EMERGENCY MEDICINE | Admitting: STUDENT IN AN ORGANIZED HEALTH CARE EDUCATION/TRAINING PROGRAM
Payer: COMMERCIAL

## 2023-08-15 ENCOUNTER — APPOINTMENT (OUTPATIENT)
Dept: GENERAL RADIOLOGY | Age: 33
DRG: 309 | End: 2023-08-15
Payer: COMMERCIAL

## 2023-08-15 DIAGNOSIS — I48.91 RAPID ATRIAL FIBRILLATION (HCC): Primary | ICD-10-CM

## 2023-08-15 LAB
ALBUMIN SERPL-MCNC: 4.3 G/DL (ref 3.4–5)
ALBUMIN/GLOB SERPL: 1.4 {RATIO} (ref 1.1–2.2)
ALP SERPL-CCNC: 96 U/L (ref 40–129)
ALT SERPL-CCNC: 32 U/L (ref 10–40)
ANION GAP SERPL CALCULATED.3IONS-SCNC: 13 MMOL/L (ref 3–16)
AST SERPL-CCNC: 55 U/L (ref 15–37)
BASOPHILS # BLD: 0.1 K/UL (ref 0–0.2)
BASOPHILS NFR BLD: 1 %
BILIRUB SERPL-MCNC: 1 MG/DL (ref 0–1)
BUN SERPL-MCNC: 17 MG/DL (ref 7–20)
CALCIUM SERPL-MCNC: 9.5 MG/DL (ref 8.3–10.6)
CHLORIDE SERPL-SCNC: 101 MMOL/L (ref 99–110)
CO2 SERPL-SCNC: 21 MMOL/L (ref 21–32)
CREAT SERPL-MCNC: 1.1 MG/DL (ref 0.9–1.3)
DEPRECATED RDW RBC AUTO: 13.5 % (ref 12.4–15.4)
EOSINOPHIL # BLD: 0.2 K/UL (ref 0–0.6)
EOSINOPHIL NFR BLD: 1.6 %
GFR SERPLBLD CREATININE-BSD FMLA CKD-EPI: >60 ML/MIN/{1.73_M2}
GLUCOSE SERPL-MCNC: 146 MG/DL (ref 70–99)
HCT VFR BLD AUTO: 47 % (ref 40.5–52.5)
HGB BLD-MCNC: 15.9 G/DL (ref 13.5–17.5)
INR PPP: 1.07 (ref 0.84–1.16)
LYMPHOCYTES # BLD: 3.5 K/UL (ref 1–5.1)
LYMPHOCYTES NFR BLD: 29.5 %
MAGNESIUM SERPL-MCNC: 2 MG/DL (ref 1.8–2.4)
MCH RBC QN AUTO: 31 PG (ref 26–34)
MCHC RBC AUTO-ENTMCNC: 33.9 G/DL (ref 31–36)
MCV RBC AUTO: 91.5 FL (ref 80–100)
MONOCYTES # BLD: 0.8 K/UL (ref 0–1.3)
MONOCYTES NFR BLD: 6.4 %
NEUTROPHILS # BLD: 7.3 K/UL (ref 1.7–7.7)
NEUTROPHILS NFR BLD: 61.5 %
NT-PROBNP SERPL-MCNC: 47 PG/ML (ref 0–124)
PLATELET # BLD AUTO: ABNORMAL K/UL (ref 135–450)
PLATELET BLD QL SMEAR: ABNORMAL
PMV BLD AUTO: ABNORMAL FL (ref 5–10.5)
POTASSIUM SERPL-SCNC: 3.5 MMOL/L (ref 3.5–5.1)
PROT SERPL-MCNC: 7.3 G/DL (ref 6.4–8.2)
PROTHROMBIN TIME: 13.9 SEC (ref 11.5–14.8)
RBC # BLD AUTO: 5.13 M/UL (ref 4.2–5.9)
SLIDE REVIEW: ABNORMAL
SODIUM SERPL-SCNC: 135 MMOL/L (ref 136–145)
TROPONIN, HIGH SENSITIVITY: 7 NG/L (ref 0–22)
WBC # BLD AUTO: 11.8 K/UL (ref 4–11)

## 2023-08-15 PROCEDURE — 36415 COLL VENOUS BLD VENIPUNCTURE: CPT

## 2023-08-15 PROCEDURE — 85025 COMPLETE CBC W/AUTO DIFF WBC: CPT

## 2023-08-15 PROCEDURE — 6370000000 HC RX 637 (ALT 250 FOR IP): Performed by: STUDENT IN AN ORGANIZED HEALTH CARE EDUCATION/TRAINING PROGRAM

## 2023-08-15 PROCEDURE — 71045 X-RAY EXAM CHEST 1 VIEW: CPT

## 2023-08-15 PROCEDURE — 2500000003 HC RX 250 WO HCPCS: Performed by: EMERGENCY MEDICINE

## 2023-08-15 PROCEDURE — 93005 ELECTROCARDIOGRAM TRACING: CPT | Performed by: EMERGENCY MEDICINE

## 2023-08-15 PROCEDURE — 85610 PROTHROMBIN TIME: CPT

## 2023-08-15 PROCEDURE — 84484 ASSAY OF TROPONIN QUANT: CPT

## 2023-08-15 PROCEDURE — 96374 THER/PROPH/DIAG INJ IV PUSH: CPT

## 2023-08-15 PROCEDURE — 2580000003 HC RX 258: Performed by: STUDENT IN AN ORGANIZED HEALTH CARE EDUCATION/TRAINING PROGRAM

## 2023-08-15 PROCEDURE — 82077 ASSAY SPEC XCP UR&BREATH IA: CPT

## 2023-08-15 PROCEDURE — 83880 ASSAY OF NATRIURETIC PEPTIDE: CPT

## 2023-08-15 PROCEDURE — 83735 ASSAY OF MAGNESIUM: CPT

## 2023-08-15 PROCEDURE — 99285 EMERGENCY DEPT VISIT HI MDM: CPT

## 2023-08-15 PROCEDURE — 2060000000 HC ICU INTERMEDIATE R&B

## 2023-08-15 PROCEDURE — 80053 COMPREHEN METABOLIC PANEL: CPT

## 2023-08-15 PROCEDURE — 1200000000 HC SEMI PRIVATE

## 2023-08-15 RX ORDER — DILTIAZEM HYDROCHLORIDE 5 MG/ML
10 INJECTION INTRAVENOUS ONCE
Status: COMPLETED | OUTPATIENT
Start: 2023-08-15 | End: 2023-08-15

## 2023-08-15 RX ORDER — METOPROLOL TARTRATE 50 MG/1
50 TABLET, FILM COATED ORAL 2 TIMES DAILY
Status: DISCONTINUED | OUTPATIENT
Start: 2023-08-15 | End: 2023-08-16

## 2023-08-15 RX ORDER — ASPIRIN 81 MG/1
81 TABLET, CHEWABLE ORAL DAILY
Status: CANCELLED | OUTPATIENT
Start: 2023-08-16

## 2023-08-15 RX ORDER — DILTIAZEM HCL IN NACL,ISO-OSM 125 MG/125
2.5-15 PLASTIC BAG, INJECTION (ML) INTRAVENOUS CONTINUOUS
Status: DISCONTINUED | OUTPATIENT
Start: 2023-08-15 | End: 2023-08-16

## 2023-08-15 RX ORDER — SODIUM CHLORIDE, SODIUM LACTATE, POTASSIUM CHLORIDE, AND CALCIUM CHLORIDE .6; .31; .03; .02 G/100ML; G/100ML; G/100ML; G/100ML
500 INJECTION, SOLUTION INTRAVENOUS ONCE
Status: COMPLETED | OUTPATIENT
Start: 2023-08-15 | End: 2023-08-15

## 2023-08-15 RX ADMIN — DILTIAZEM HYDROCHLORIDE 10 MG: 5 INJECTION INTRAVENOUS at 22:30

## 2023-08-15 RX ADMIN — SODIUM CHLORIDE, POTASSIUM CHLORIDE, SODIUM LACTATE AND CALCIUM CHLORIDE 500 ML: 600; 310; 30; 20 INJECTION, SOLUTION INTRAVENOUS at 23:47

## 2023-08-15 RX ADMIN — Medication 5 MG/HR: at 22:33

## 2023-08-15 RX ADMIN — METOPROLOL TARTRATE 50 MG: 50 TABLET ORAL at 23:48

## 2023-08-15 ASSESSMENT — PAIN - FUNCTIONAL ASSESSMENT: PAIN_FUNCTIONAL_ASSESSMENT: NONE - DENIES PAIN

## 2023-08-15 ASSESSMENT — LIFESTYLE VARIABLES
HOW MANY STANDARD DRINKS CONTAINING ALCOHOL DO YOU HAVE ON A TYPICAL DAY: 7 TO 9
HOW OFTEN DO YOU HAVE A DRINK CONTAINING ALCOHOL: 4 OR MORE TIMES A WEEK

## 2023-08-16 VITALS
RESPIRATION RATE: 17 BRPM | HEIGHT: 75 IN | DIASTOLIC BLOOD PRESSURE: 78 MMHG | SYSTOLIC BLOOD PRESSURE: 110 MMHG | TEMPERATURE: 98 F | BODY MASS INDEX: 30.04 KG/M2 | OXYGEN SATURATION: 98 % | HEART RATE: 81 BPM | WEIGHT: 241.62 LBS

## 2023-08-16 DIAGNOSIS — I48.91 ATRIAL FIBRILLATION, UNSPECIFIED TYPE (HCC): Primary | ICD-10-CM

## 2023-08-16 PROBLEM — E66.811 OBESITY (BMI 30.0-34.9): Status: ACTIVE | Noted: 2023-08-16

## 2023-08-16 PROBLEM — E66.9 OBESITY (BMI 30.0-34.9): Status: ACTIVE | Noted: 2023-08-16

## 2023-08-16 PROBLEM — E87.1 HYPONATREMIA: Status: ACTIVE | Noted: 2023-08-16

## 2023-08-16 LAB
AMPHETAMINES UR QL SCN>1000 NG/ML: ABNORMAL
BARBITURATES UR QL SCN>200 NG/ML: ABNORMAL
BENZODIAZ UR QL SCN>200 NG/ML: ABNORMAL
CANNABINOIDS UR QL SCN>50 NG/ML: POSITIVE
COCAINE UR QL SCN: ABNORMAL
DRUG SCREEN COMMENT UR-IMP: ABNORMAL
EKG ATRIAL RATE: 163 BPM
EKG ATRIAL RATE: 76 BPM
EKG DIAGNOSIS: NORMAL
EKG DIAGNOSIS: NORMAL
EKG P AXIS: -11 DEGREES
EKG P-R INTERVAL: 168 MS
EKG Q-T INTERVAL: 296 MS
EKG Q-T INTERVAL: 374 MS
EKG QRS DURATION: 94 MS
EKG QRS DURATION: 96 MS
EKG QTC CALCULATION (BAZETT): 420 MS
EKG QTC CALCULATION (BAZETT): 461 MS
EKG R AXIS: 15 DEGREES
EKG R AXIS: 27 DEGREES
EKG T AXIS: 36 DEGREES
EKG T AXIS: 52 DEGREES
EKG VENTRICULAR RATE: 146 BPM
EKG VENTRICULAR RATE: 76 BPM
ETHANOLAMINE SERPL-MCNC: NORMAL MG/DL (ref 0–0.08)
FENTANYL SCREEN, URINE: ABNORMAL
LV EF: 53 %
LVEF MODALITY: NORMAL
METHADONE UR QL SCN>300 NG/ML: ABNORMAL
OPIATES UR QL SCN>300 NG/ML: ABNORMAL
OXYCODONE UR QL SCN: ABNORMAL
PCP UR QL SCN>25 NG/ML: ABNORMAL
PH UR STRIP: 5 [PH]
TROPONIN, HIGH SENSITIVITY: 8 NG/L (ref 0–22)
TSH SERPL DL<=0.005 MIU/L-ACNC: 2.23 UIU/ML (ref 0.27–4.2)

## 2023-08-16 PROCEDURE — 6360000002 HC RX W HCPCS: Performed by: INTERNAL MEDICINE

## 2023-08-16 PROCEDURE — 93010 ELECTROCARDIOGRAM REPORT: CPT | Performed by: INTERNAL MEDICINE

## 2023-08-16 PROCEDURE — 92960 CARDIOVERSION ELECTRIC EXT: CPT | Performed by: INTERNAL MEDICINE

## 2023-08-16 PROCEDURE — 2580000003 HC RX 258: Performed by: INTERNAL MEDICINE

## 2023-08-16 PROCEDURE — 99223 1ST HOSP IP/OBS HIGH 75: CPT | Performed by: INTERNAL MEDICINE

## 2023-08-16 PROCEDURE — 93306 TTE W/DOPPLER COMPLETE: CPT

## 2023-08-16 PROCEDURE — 92960 CARDIOVERSION ELECTRIC EXT: CPT

## 2023-08-16 PROCEDURE — 6370000000 HC RX 637 (ALT 250 FOR IP): Performed by: STUDENT IN AN ORGANIZED HEALTH CARE EDUCATION/TRAINING PROGRAM

## 2023-08-16 PROCEDURE — 7100000010 HC PHASE II RECOVERY - FIRST 15 MIN

## 2023-08-16 PROCEDURE — 93005 ELECTROCARDIOGRAM TRACING: CPT | Performed by: INTERNAL MEDICINE

## 2023-08-16 PROCEDURE — 99152 MOD SED SAME PHYS/QHP 5/>YRS: CPT | Performed by: INTERNAL MEDICINE

## 2023-08-16 PROCEDURE — 6360000002 HC RX W HCPCS: Performed by: STUDENT IN AN ORGANIZED HEALTH CARE EDUCATION/TRAINING PROGRAM

## 2023-08-16 PROCEDURE — 5A2204Z RESTORATION OF CARDIAC RHYTHM, SINGLE: ICD-10-PCS | Performed by: INTERNAL MEDICINE

## 2023-08-16 PROCEDURE — 2580000003 HC RX 258: Performed by: STUDENT IN AN ORGANIZED HEALTH CARE EDUCATION/TRAINING PROGRAM

## 2023-08-16 PROCEDURE — 36415 COLL VENOUS BLD VENIPUNCTURE: CPT

## 2023-08-16 PROCEDURE — 80307 DRUG TEST PRSMV CHEM ANLYZR: CPT

## 2023-08-16 PROCEDURE — 84443 ASSAY THYROID STIM HORMONE: CPT

## 2023-08-16 RX ORDER — SODIUM CHLORIDE 0.9 % (FLUSH) 0.9 %
5-40 SYRINGE (ML) INJECTION PRN
Status: DISCONTINUED | OUTPATIENT
Start: 2023-08-16 | End: 2023-08-16 | Stop reason: HOSPADM

## 2023-08-16 RX ORDER — LORAZEPAM 1 MG/1
3 TABLET ORAL
Status: DISCONTINUED | OUTPATIENT
Start: 2023-08-16 | End: 2023-08-16 | Stop reason: HOSPADM

## 2023-08-16 RX ORDER — LORAZEPAM 2 MG/ML
3 INJECTION INTRAMUSCULAR
Status: DISCONTINUED | OUTPATIENT
Start: 2023-08-16 | End: 2023-08-16 | Stop reason: HOSPADM

## 2023-08-16 RX ORDER — LORAZEPAM 2 MG/ML
2 INJECTION INTRAMUSCULAR
Status: DISCONTINUED | OUTPATIENT
Start: 2023-08-16 | End: 2023-08-16 | Stop reason: HOSPADM

## 2023-08-16 RX ORDER — SODIUM CHLORIDE 0.9 % (FLUSH) 0.9 %
5-40 SYRINGE (ML) INJECTION EVERY 12 HOURS SCHEDULED
Status: DISCONTINUED | OUTPATIENT
Start: 2023-08-16 | End: 2023-08-16 | Stop reason: HOSPADM

## 2023-08-16 RX ORDER — LORAZEPAM 1 MG/1
2 TABLET ORAL
Status: DISCONTINUED | OUTPATIENT
Start: 2023-08-16 | End: 2023-08-16 | Stop reason: HOSPADM

## 2023-08-16 RX ORDER — POLYETHYLENE GLYCOL 3350 17 G/17G
17 POWDER, FOR SOLUTION ORAL DAILY PRN
Status: DISCONTINUED | OUTPATIENT
Start: 2023-08-16 | End: 2023-08-16 | Stop reason: HOSPADM

## 2023-08-16 RX ORDER — ACETAMINOPHEN 325 MG/1
650 TABLET ORAL EVERY 6 HOURS PRN
Status: DISCONTINUED | OUTPATIENT
Start: 2023-08-16 | End: 2023-08-16 | Stop reason: HOSPADM

## 2023-08-16 RX ORDER — LORAZEPAM 1 MG/1
1 TABLET ORAL
Status: DISCONTINUED | OUTPATIENT
Start: 2023-08-16 | End: 2023-08-16 | Stop reason: HOSPADM

## 2023-08-16 RX ORDER — LORAZEPAM 2 MG/ML
1 INJECTION INTRAMUSCULAR
Status: DISCONTINUED | OUTPATIENT
Start: 2023-08-16 | End: 2023-08-16 | Stop reason: HOSPADM

## 2023-08-16 RX ORDER — LORAZEPAM 2 MG/ML
4 INJECTION INTRAMUSCULAR
Status: DISCONTINUED | OUTPATIENT
Start: 2023-08-16 | End: 2023-08-16 | Stop reason: HOSPADM

## 2023-08-16 RX ORDER — ENOXAPARIN SODIUM 100 MG/ML
30 INJECTION SUBCUTANEOUS 2 TIMES DAILY
Status: DISCONTINUED | OUTPATIENT
Start: 2023-08-16 | End: 2023-08-16

## 2023-08-16 RX ORDER — ONDANSETRON 2 MG/ML
4 INJECTION INTRAMUSCULAR; INTRAVENOUS EVERY 6 HOURS PRN
Status: DISCONTINUED | OUTPATIENT
Start: 2023-08-16 | End: 2023-08-16 | Stop reason: HOSPADM

## 2023-08-16 RX ORDER — ENOXAPARIN SODIUM 100 MG/ML
70 INJECTION SUBCUTANEOUS ONCE
Status: COMPLETED | OUTPATIENT
Start: 2023-08-16 | End: 2023-08-16

## 2023-08-16 RX ORDER — ACETAMINOPHEN 650 MG/1
650 SUPPOSITORY RECTAL EVERY 6 HOURS PRN
Status: DISCONTINUED | OUTPATIENT
Start: 2023-08-16 | End: 2023-08-16 | Stop reason: HOSPADM

## 2023-08-16 RX ORDER — SODIUM CHLORIDE 9 MG/ML
INJECTION, SOLUTION INTRAVENOUS PRN
Status: DISCONTINUED | OUTPATIENT
Start: 2023-08-16 | End: 2023-08-16 | Stop reason: HOSPADM

## 2023-08-16 RX ORDER — METOPROLOL TARTRATE 50 MG/1
75 TABLET, FILM COATED ORAL 2 TIMES DAILY
Qty: 90 TABLET | Refills: 1 | Status: SHIPPED | OUTPATIENT
Start: 2023-08-16

## 2023-08-16 RX ORDER — LORAZEPAM 1 MG/1
4 TABLET ORAL
Status: DISCONTINUED | OUTPATIENT
Start: 2023-08-16 | End: 2023-08-16 | Stop reason: HOSPADM

## 2023-08-16 RX ORDER — LANOLIN ALCOHOL/MO/W.PET/CERES
100 CREAM (GRAM) TOPICAL DAILY
Status: DISCONTINUED | OUTPATIENT
Start: 2023-08-16 | End: 2023-08-16 | Stop reason: HOSPADM

## 2023-08-16 RX ORDER — ONDANSETRON 4 MG/1
4 TABLET, ORALLY DISINTEGRATING ORAL EVERY 8 HOURS PRN
Status: DISCONTINUED | OUTPATIENT
Start: 2023-08-16 | End: 2023-08-16 | Stop reason: HOSPADM

## 2023-08-16 RX ADMIN — Medication 5 ML: at 08:10

## 2023-08-16 RX ADMIN — ENOXAPARIN SODIUM 30 MG: 100 INJECTION SUBCUTANEOUS at 08:10

## 2023-08-16 RX ADMIN — METOPROLOL TARTRATE 75 MG: 25 TABLET, FILM COATED ORAL at 08:10

## 2023-08-16 RX ADMIN — AMIODARONE HYDROCHLORIDE 300 MG: 50 INJECTION, SOLUTION INTRAVENOUS at 09:15

## 2023-08-16 RX ADMIN — Medication 100 MG: at 09:14

## 2023-08-16 RX ADMIN — ENOXAPARIN SODIUM 70 MG: 100 INJECTION SUBCUTANEOUS at 13:31

## 2023-08-16 NOTE — PROGRESS NOTES
Pt admitted to room 5916 from ED. VSS. Pt A&Ox4. POC updated with pt, all questions answered. Oriented pt to room and call light. Call light and bedside table within reach. Instructed to call out with any needs.

## 2023-08-16 NOTE — PROGRESS NOTES
Amiodarone infusion initiated and completed per [facility] Per Dr. Nancie Crowley verbal orders Cardizem gtt stopped prior to initiating Amiodarone gtt.

## 2023-08-16 NOTE — DISCHARGE SUMMARY
[address] [facility]    Patient Demographics    Patient. Harriet Nyhan  Date of Birth. 1990  MRN. [id number]     Primary care provider. Leia Worrell  (Tel: [phone])    Admit date: 8/15/2023    Discharge date (blank if same as Note Date): Note Date: 8/16/2023     Reason for Hospitalization. Chief Complaint   Patient presents with    Tachycardia     Hx a fib, takes meds reg, , pt c/o palpitations PTA       Significant Findings. Principal Problem:    Rapid atrial fibrillation (HCC)  Active Problems:    Benign essential HTN    Hyponatremia    Obesity (BMI 30.0-34. 9)  Resolved Problems:    * No resolved hospital problems. [facility]. Harriet Nyhan is a 35 y.o. male with a past medical history of hypertension, MATY, alcohol use disorder and marijuana use who presented with palpitations. Noted to be in AF with RVR. Evaluated by cardiology received IV amiodarone bolus. Remains in AF with CVR and s/p DCCV 8/16/2023. Placed on Eliquis 5 mg bid short term 30 days and metoprolol was increased to 75 mg bid. Assessment and Plan:  Atrial Fibrillation with RVR- PAF              - Remains in Atrial fibrillation              - NPO for possible DCCV              - Off dilt gtt with controlled rates in AF              - Risk factors include ETOH use, MATY, ad obesity, discussed modifications  Hypertension              - On metoprolol, controlled  MATY              - Denies official diagosis, he has never had sleep study and does not wear CPAP              - Admits to snoring, has ETOH disorder and AF, would benefit from OP referral.     Echo should be completed as OP if unable to obtain today per cardiology. Rx requested at usually pharmacy. Discussed with Janel Rios RN and she is to provide patient with 30 day free trial card prior to discharge. Discharge recommendations given to patient. Follow Up.  PCP in 1 week, cardiollgy in 3-4

## 2023-08-16 NOTE — PROCEDURES
[facility]     Electrophysiology Procedure Note       Date of Procedure: 8/16/2023  Patient's Name: Keaton Martinez  YOB: 1990   Medical Record Number: [id number]  Procedure Performed by: Ronald Myers MD    Procedures performed:    External Electrical cardioversion   IV sedation. Start time: 1:30 PM  Stop time: 1:40 PM    Medication: Brevital Sodium   Sedation medication was given by physician     Indication of the procedure: Persistent atrial fibrillation     Details of procedure: The patient was brought to the cath lab area in a fasting and non-sedated state. The risks, benefits and alternatives of the procedure were discussed with the patient. The patient opted to proceed with the procedure. Written informed consent was signed and placed in the chart. A timeout protocol was completed to identify the patient and the procedure being performed. Then we used brevital for sedation. 100 mg Brevital was given by me as one dose, and electrical DC cardioversion was perfomred using 200J, synchronized shock. Patient was converted to sinus rhythm. The patient tolerated the procedure well and there were no complications. Conclusion:   Successful external DC cardioversion of atrial fibrillation. Patient needs short term anticoagulation for 3-4 weeks. Will start Eliquis.

## 2023-08-16 NOTE — H&P
History and Physical      Name:  Juany Edge /Age/Sex: 1990  (35 y.o. male)   MRN & CSN:  [id number] & [id number] Encounter Date/Time:8/15/2023 11:20 PM EDT   Location:  [id number] PCP: Xander Alexandria Jocelynn and Plan:   Juany Edge is a 35 y.o. male with past medical history of Paroxysmal A-Fib, hypertension, obesity who presents with Atrial fibrillation with rapid ventricular response [facility])    Hospital Problems             Last Modified POA    * (Principal) Atrial fibrillation with rapid ventricular response ([address]) 8/15/2023 Yes       Paroxysmal Atrial fibrillation with RVR  Likely exacerbated by increased Etoh ingestion last night  CHADsVASc 1  Onset <48 hours  Cardizem bolus and drip started in ED  Resume Lopressor and increase dose to 75mg BID and wean off drip  LR bolus 500ml x1  Recheck TSH w/ reflex  If no resolution may need DCCV, hence keep NPO at midnight  Patient appears to be a good candidate for pill in the pocket strategy    Alcohol use disorder  Check UDS and Etoh level  Ativan per [facility] protocol  Counseled on Etoh cessation    Obesity, BMI 31.25  Counseled on weight loss      Stepdown w/ telemetry  Full code    Disposition:   Current Living situation: Home  Expected Disposition: Home  Estimated D/C: 1-2 days    Diet ADULT DIET; Regular   DVT Prophylaxis [x] Lovenox, []  Heparin, [] SCDs, [] Ambulation,  [] Eliquis, [] Xarelto, [] Coumadin   Code Status Full Code   Surrogate Decision Maker/ ALIREZA Williamson     Personally reviewed Lab Studies and Imaging   EKG interpreted personally and results A fib rvr, 146/min, no ST-T wave abnormality    History from:     Patient     History of Present Illness:     Chief Complaint: Palpitations    Juany Edge is a 35 y.o. male with past medical history of Paroxysmal A-Fib who presents with Atrial fibrillation with rapid ventricular response.  States that almost 2 hours before presentation to ED started having persistent palpitations

## 2023-08-16 NOTE — PROGRESS NOTES
Data- discharge order received, pt verbalized agreement to discharge, disposition to previous residence, no needs for HHC/DME. Action- discharge instructions prepared and given to pt, pt verbalized understanding. Medication information packet given r/t NEW and/or CHANGED prescriptions emphasizing name/purpose/side effects, pt verbalized understanding. Discharge instruction summary: Diet- general, Activity- ***, Primary Care Physician as follows: Baljeet Casarez [phone] f/u appointment ***, immunizations reviewed and ***, prescription medications filled ***. Inpatient surgical procedure precautions reviewed: *** CHF Education reviewed. Pt/ Family has had a total of 60 minutes CHF education this admission encounter. 1. WEIGHT: Admit Weight - Scale: 250 lb (113.4 kg) (08/15/23 2216)        Today  Weight - Scale: 241 lb 10 oz (109.6 kg) (08/16/23 0007)       2. O2 SAT.: SpO2: 98 % (08/16/23 1134)    Response- Pt belongings gathered, IV removed. Disposition is home (no HHC/DME needs), transported with ***, taken to lobby via w/c w/ ***, no complications. Bill For Surgical Tray: no

## 2023-08-16 NOTE — PROGRESS NOTES
Hospitalist Progress Note      Name:  Dave Dutta /Age/Sex: 1990  (35 y.o. male)   MRN & CSN:  [id number] & [id number] Encounter Date/Time: 2023 7:42 AM EDT   Location:  [id number]/0666-43 PCP: [address] Keny Travis, [address] [place] [address] Day: 2    Subjective:   Chief Complaint:   Chief Complaint   Patient presents with    Tachycardia     Hx a fib, takes meds reg, , pt c/o palpitations PTA     Dave Dutta is a 35 y.o. male with a past medical history of hypertension, MATY, alcohol use disorder and marijuana use who presented with palpitations. Noted to be in AF with RVR. Evaluated by cardiology received IV amiodarone bolus. Remains in AF with CVR and plans for DCCV today if not converted spontaneously. Interval History: Today, resting in bed. Denies CP, SOB, or orthopnea. Rates controlled 70-80's in atrial fibrillation. He admits to palpitations. Initially he was tachycardic 's per patient and he was dizzy, fatigued, and SOB. Feels better in controlled rates, but remains symptomatic. Independently reviewed interval ancillary notes from cardiology. Assessment and Recommendations   Problem List  Principal Problem:    Atrial fibrillation with rapid ventricular response (HCC)  Resolved Problems:    * No resolved hospital problems. *     Assessment and Plan:    Atrial Fibrillation with RVR- PAF   - Remains in Atrial fibrillation   - NPO for possible DCCV   - Off dilt gtt with controlled rates in AF   - Risk factors include ETOH use, MATY, ad obesity, discussed modifications  Hypertension   - On metoprolol, controlled  MATY   - Denies official diagosis, he has never had sleep study and does not wear CPAP   - Admits to snoring, has ETOH disorder and AF, would benefit from OP referral.   Plan:   NPO  DCCV later today  Echo per cards recs, ?  If can be done as OP  Anticipate discharge in next 24 hours, patient denies needs, CM consult for ETOH rehabilitation

## 2023-08-16 NOTE — ED PROVIDER NOTES
[facility]) Emergency [address]    Yovany Phillips MD, am the primary clinician of record. CHIEF COMPLAINT  Chief Complaint   Patient presents with    Tachycardia     Hx a fib, takes meds reg, , pt c/o palpitations PTA      HISTORY OF PRESENT ILLNESS  Yvette Boykin is a 35 y.o. male  who presents to the ED complaining of acute onset this evening of palpitations and some fluttering in his chest similar to previous episodes of PAF. No syncope but feels a little lightheaded. Was sitting in bed watching a low key movie when the sx began tonight unprovoked. [place] like he may have had brief afib earlier in the day but is typically sinus. He is not anticoagulated but is on metoprolol and has not missed doses. Drank etoh yesterday evening but not today, does use THC with some regularity but not excessive caffeine or other stimulants like cocaine/meth. No fevers cough SOB or recent illnesses or cold sx. No abd pain or n/v/d. Arrives by EMS with rapid afib but stable BP per their report. He smokes a \"little bit. \"  Last saw cardiology 2/2023 and [id number] score was 1 so he was not an anticoagulation candidate per the documentation by ANITA Shin. No other complaints, modifying factors or associated symptoms. I have reviewed the following from the nursing documentation.     Past Medical History:   Diagnosis Date    Heart palpitations     Hypertension     MATY (obstructive sleep apnea)     suspected     Past Surgical History:   Procedure Laterality Date    FRACTURE SURGERY      HAND SURGERY      LEFT - ORIF Boxer's Fracture    TYMPANOSTOMY TUBE PLACEMENT       Family History   Problem Relation Age of Onset    Diabetes Mother     High Cholesterol Mother     Arthritis Father     Asthma Father     Arrhythmia Father     Atrial Fibrillation Father     Hearing Loss Father     High Blood Pressure Father     Immune Disorder Father     Heart Disease Sister     Other Sister         POTS &

## 2023-08-16 NOTE — PLAN OF CARE
Problem: Discharge Planning  Goal: Discharge to home or other facility with appropriate resources  8/16/2023 1011 by Mary Lou Looney RN  Outcome: Progressing

## 2023-08-16 NOTE — CONSULTS
[address] [facility]   Electrophysiology Consultation   Date: 8/16/2023  Reason for Consultation: paroxysmal atrial fibrillation    Consult Requesting Physician: Bj Lynn MD     Chief Complaint   Patient presents with    Tachycardia     Hx a fib, takes meds reg, , pt c/o palpitations PTA     HPI: Johana Hanley is a 35 y.o. with Hx of paroxysmal atrial fibrillation presented with Atrial fibrillation and rapid ventricular responsehe went to Atrial fibrillation a few hours before presentation. This was to follow the previous night's alcohol consumption. Past Medical History:   Diagnosis Date    Heart palpitations     Hypertension     MATY (obstructive sleep apnea)     suspected        Past Surgical History:   Procedure Laterality Date    FRACTURE SURGERY      HAND SURGERY      LEFT - ORIF Boxer's Fracture    TYMPANOSTOMY TUBE PLACEMENT         No Known Allergies    Social History:  Reviewed. reports that he has quit smoking. His smoking use included cigarettes. He started smoking about 2 years ago. He has a 2.00 pack-year smoking history. He uses smokeless tobacco. He reports current alcohol use of about 15.0 standard drinks per week. He reports current drug use. Drug: Marijuana Megan Nascimento). Family History:  Reviewed. family history includes Arrhythmia in his father and sister; Arthritis in his father; Asthma in his father; Atrial Fibrillation in his father; Diabetes in his mother; Hearing Loss in his father; Heart Disease in his sister; High Blood Pressure in his father; High Cholesterol in his mother; Immune Disorder in his father; Other in his sister. Review of System:  All other systems reviewed and are negative except for that noted above.  Pertinent negatives are:     General: negative for fever, chills   Ophthalmic ROS: negative for - eye pain or loss of vision  ENT ROS: negative for - headaches, sore throat   Respiratory: negative for - cough, sputum  Cardiovascular: Reviewed in

## 2023-08-16 NOTE — ED NOTES
Pt transported to [facility] in stable condition on bedside monitor with infusions running per eMAR by this RN      Allie Giles RN  08/16/23 6325

## 2023-08-25 PROBLEM — I48.91 RAPID ATRIAL FIBRILLATION (HCC): Status: RESOLVED | Noted: 2023-08-15 | Resolved: 2023-08-25

## 2023-08-25 PROBLEM — E87.1 HYPONATREMIA: Status: RESOLVED | Noted: 2023-08-16 | Resolved: 2023-08-25

## 2023-08-25 PROBLEM — E66.01 CLASS 2 SEVERE OBESITY DUE TO EXCESS CALORIES WITH SERIOUS COMORBIDITY AND BODY MASS INDEX (BMI) OF 35.0 TO 35.9 IN ADULT (HCC): Status: RESOLVED | Noted: 2023-02-27 | Resolved: 2023-08-25

## 2023-08-25 PROBLEM — E66.812 CLASS 2 SEVERE OBESITY DUE TO EXCESS CALORIES WITH SERIOUS COMORBIDITY AND BODY MASS INDEX (BMI) OF 35.0 TO 35.9 IN ADULT: Status: RESOLVED | Noted: 2023-02-27 | Resolved: 2023-08-25

## 2023-08-28 ENCOUNTER — TELEPHONE (OUTPATIENT)
Dept: CARDIOLOGY CLINIC | Age: 33
End: 2023-08-28

## 2023-08-28 NOTE — TELEPHONE ENCOUNTER
He can increase his metoprolol to 100 mg BID if he wants, otherwise, we can discuss things further at his upcoming visit No lymphadedenopathy

## 2023-08-28 NOTE — TELEPHONE ENCOUNTER
Pt states his heart has been skipping beats thru out the day for the last 3 days. No other symptoms. States BP is running 114/61 HR 74. Please call to advise.

## 2023-10-31 ENCOUNTER — TELEPHONE (OUTPATIENT)
Dept: CARDIOLOGY CLINIC | Age: 33
End: 2023-10-31

## 2023-10-31 NOTE — TELEPHONE ENCOUNTER
Patient states that for the last 24 hours his heart has been skipping every 1-2 minutes. Patient's blood pressure and heart rate have been good and the patient feels fine. Patient is fatigued all the time, has no energy. Please call patient and advise.   yolanda

## 2023-10-31 NOTE — TELEPHONE ENCOUNTER
He may take extra dose of metoprolol 50 mg if her BP is okay. Go to ER if symptoms are severe .   He has appt tomorrow with   Mxa tommorrow

## 2023-11-01 ENCOUNTER — OFFICE VISIT (OUTPATIENT)
Dept: CARDIOLOGY CLINIC | Age: 33
End: 2023-11-01
Payer: COMMERCIAL

## 2023-11-01 ENCOUNTER — TELEPHONE (OUTPATIENT)
Dept: CARDIOLOGY CLINIC | Age: 33
End: 2023-11-01

## 2023-11-01 VITALS
HEART RATE: 90 BPM | OXYGEN SATURATION: 97 % | HEIGHT: 75 IN | BODY MASS INDEX: 29.38 KG/M2 | WEIGHT: 236.3 LBS | SYSTOLIC BLOOD PRESSURE: 110 MMHG | DIASTOLIC BLOOD PRESSURE: 72 MMHG

## 2023-11-01 DIAGNOSIS — I42.8 OTHER CARDIOMYOPATHY (HCC): ICD-10-CM

## 2023-11-01 DIAGNOSIS — E66.9 OBESITY (BMI 30.0-34.9): ICD-10-CM

## 2023-11-01 DIAGNOSIS — I10 BENIGN ESSENTIAL HTN: ICD-10-CM

## 2023-11-01 DIAGNOSIS — F10.10 ALCOHOL ABUSE: ICD-10-CM

## 2023-11-01 DIAGNOSIS — I48.0 PAROXYSMAL ATRIAL FIBRILLATION (HCC): Primary | ICD-10-CM

## 2023-11-01 PROCEDURE — 3078F DIAST BP <80 MM HG: CPT | Performed by: INTERNAL MEDICINE

## 2023-11-01 PROCEDURE — 3074F SYST BP LT 130 MM HG: CPT | Performed by: INTERNAL MEDICINE

## 2023-11-01 PROCEDURE — 99214 OFFICE O/P EST MOD 30 MIN: CPT | Performed by: INTERNAL MEDICINE

## 2023-11-01 PROCEDURE — 93000 ELECTROCARDIOGRAM COMPLETE: CPT | Performed by: INTERNAL MEDICINE

## 2023-11-01 NOTE — TELEPHONE ENCOUNTER
Genetic testing completed  [facility] called to schedule a pickup    Pickup number [phone], pickup scheduled for 11/2/2023 between 8am to 4 pm

## 2023-11-01 NOTE — PROGRESS NOTES
[facility]   Electrophysiology Follow Up  Date: 11/1/2023    CC: PAF  HPI: Gabriella Dukes is a 35 y.o. male With past medical history PMH paroxysmal atrial fibrillation with RVR, chest pain. HTN  Seen in consult 01/08/2021 ED Admit AFib w ith RVR in the setting of binge drinking and not taking his metoprolol. 03/17/2022 presented to ED via EMS with c/o feeling his heart skipping. Had 1/2 Fifth of whiskey the night before. Found to be in afib with RVR. Treated with cardizem drip. Dicharged on PO cardizem and metoprolol tartrate. 8/15/2023 he presented to [facility] ED with complaints of palpitations. He had been drinking alcohol and uses THC. Was in afib RVR, underwent DCCV. Echo showed moderate septal hypertrophy. Grade I diastolic dysfunction with normal LV filling pressures. Recommend Cardiac MRI to evaluate for hypertrophic cardiomyopathy     Interval History:  Donita Schaffer presents to the office in follow up. He has complaints of feeling increased palpitations. He states he quit drinking alcohol 8/2023 after being cardioverted. He has complaints of fatigue with his increased dosing of metoprolol. Assessment and plan:   Paroxysmal Atrial fibrillation with RVR    - ECG today shows SR   - Stopped cardizem 180 mg daily d/t headaches, felt this was due to sinus congestion    - in the setting of binge drinking and being off metoprolol   - Strongly encouraged complete cessation of alcohol    - Patient has a [id number] Score of 1 ( HTN)    - s/p DCCV 8/16/2023   - Stop metoprolol    - Start verapamil   I would do a repeat monitor if he has worsening of the symptoms. Irrespective of symptoms, I will do a monitor in 6 months to see if he has any episodes of A-fib and if so, we will proceed with ablation.   Possible hypertrophic Cardiomyopathy   -Echo 8/2023 showed moderate septal hypertrophy   -Father has hx of CHF   -Order placed for cardiac MRI  His echo shows subaortic septal hypertrophy that has also increase

## 2023-11-08 ENCOUNTER — TELEPHONE (OUTPATIENT)
Dept: CARDIOLOGY CLINIC | Age: 33
End: 2023-11-08

## 2023-11-08 NOTE — TELEPHONE ENCOUNTER
Last OV: 11/1/23 MXA   Plan:   Stop metoprolol  Schedule cardiac MRI   Start verapamil 120 mg daily   6 months

## 2023-11-08 NOTE — TELEPHONE ENCOUNTER
Pt called to inform MXA that his hr is staying up around 100. His heart is skipping beats since he been on verapamil 120mg. Pt is requesting someone call him to discuss what he can do.   Thank you

## 2023-11-08 NOTE — TELEPHONE ENCOUNTER
Will increase his verapamil to 180 mg daily. Ok per MXA.  He will call us back in a week or so if his symptoms do not improve

## 2023-12-07 ENCOUNTER — TELEPHONE (OUTPATIENT)
Dept: CARDIOLOGY CLINIC | Age: 33
End: 2023-12-07

## 2023-12-07 NOTE — TELEPHONE ENCOUNTER
Spoke with the patient, he reports he took Verapamil at 6 am this morning. Reports HR is 84 and 80 bpm. Admits to lightheadedness previously this weak. Has flushed face and sweaty palms. Admits to remaining hydrated. Denies alcohol intake or substance. Tolerating Verapamil. Discussed with MXA. Instructed to increased to 1.5 tablets by mouth daily. Returned call to patient and advised of these instructions per MXA. Patient voiced understanding.

## 2023-12-07 NOTE — TELEPHONE ENCOUNTER
Pt called to inform the office that his bp is elevating. Pt is still taking verapamil 180mg. 152/93  150/99    Pt is feeling flush. Please call to discuss what he needs to do. Please advise.   Thank you

## 2023-12-08 ENCOUNTER — HOSPITAL ENCOUNTER (OUTPATIENT)
Dept: MRI IMAGING | Age: 33
Discharge: HOME | End: 2023-11-30
Attending: INTERNAL MEDICINE

## 2023-12-08 DIAGNOSIS — I42.8 OTHER CARDIOMYOPATHY (HCC): ICD-10-CM

## 2023-12-08 DIAGNOSIS — F10.10 ALCOHOL ABUSE: ICD-10-CM

## 2023-12-10 ENCOUNTER — HOSPITAL ENCOUNTER (OUTPATIENT)
Dept: MRI IMAGING | Age: 33
Discharge: HOME | End: 2023-12-10

## 2023-12-18 ENCOUNTER — TELEPHONE (OUTPATIENT)
Dept: CARDIOLOGY CLINIC | Age: 33
End: 2023-12-18

## 2023-12-18 NOTE — TELEPHONE ENCOUNTER
Pt states he has gone to [facility] 3 x and said they have cardiac MRI and has not been able to do it. Today they said his PVC's are too high. Wanting to make sure he is not charged. Please call pt to advise.

## 2023-12-18 NOTE — TELEPHONE ENCOUNTER
If the procedure was not completed then he should not be charged but that is a billing question as we do not control that. Call placed to [facility] radiology department, stated he had too many PVC's to align with the scanner.  Will discuss with VASQUEZ

## 2023-12-20 ENCOUNTER — NURSE ONLY (OUTPATIENT)
Dept: CARDIOLOGY CLINIC | Age: 33
End: 2023-12-20
Payer: COMMERCIAL

## 2023-12-20 VITALS — DIASTOLIC BLOOD PRESSURE: 88 MMHG | SYSTOLIC BLOOD PRESSURE: 128 MMHG | HEART RATE: 94 BPM | OXYGEN SATURATION: 96 %

## 2023-12-20 DIAGNOSIS — I48.0 PAROXYSMAL ATRIAL FIBRILLATION (HCC): Primary | ICD-10-CM

## 2023-12-20 PROCEDURE — 93000 ELECTROCARDIOGRAM COMPLETE: CPT | Performed by: INTERNAL MEDICINE

## 2024-01-11 ENCOUNTER — TELEPHONE (OUTPATIENT)
Dept: CARDIOLOGY CLINIC | Age: 34
End: 2024-01-11

## 2024-01-11 RX ORDER — LOSARTAN POTASSIUM 50 MG/1
50 TABLET ORAL DAILY
Qty: 30 TABLET | Refills: 5 | Status: SHIPPED | OUTPATIENT
Start: 2024-01-11

## 2024-01-11 NOTE — TELEPHONE ENCOUNTER
I spoke with pt and relayed message per RHRN. He verbalized understanding.    I called [facility] to see what became of the pt's sample.LMOM for them to locate sample.

## 2024-01-11 NOTE — TELEPHONE ENCOUNTER
Pt calling concerned because he still has not received his genetics test back for HCM, pt also wanted to ask some questions regarding heart monitor results.   Pt stated that the verapamil is not doing much for him, he comes home from work with his \"ears feeling like they are on fire\" and his BP is 162/102  Pt would like to know if there are any alternative medications  Please call and advise

## 2024-01-11 NOTE — TELEPHONE ENCOUNTER
From telephone call dated 12/22/23     Tiffanie Pinzon RN         12/22/23  9:17 AM  Note        Since he is having worsening symptoms MXA recommends doing a repeat monitor 14 day holter if he is agreeable.      I do no see results from his genetic testing. We will contact them to determine where his results are.      Will discuss what else needs to be done before is appointment with Promise Jasso MA         12/22/23  9:20 AM  Note        Pt coming in 12/22 between 1130-12 for 14 day monitor        Please advise. Thanks

## 2024-01-11 NOTE — TELEPHONE ENCOUNTER
Monitor showed 4 short episodes of atrial tachycardia.  0.09% PVC burden     Pickup number [phone], pickup scheduled for 11/2/2023 between 8am to 4 pm     Please call [facility] and see if you can track down results

## 2024-01-12 ENCOUNTER — TELEPHONE (OUTPATIENT)
Dept: CARDIOLOGY CLINIC | Age: 34
End: 2024-01-12

## 2024-01-12 NOTE — TELEPHONE ENCOUNTER
Pt called to ask the office with the new med Losartan 50mg is he to still take Verapamil 180mg.  Please call to discuss this.  Thank you

## 2024-01-17 ENCOUNTER — PATIENT MESSAGE (OUTPATIENT)
Dept: CARDIOLOGY CLINIC | Age: 34
End: 2024-01-17

## 2024-01-18 NOTE — TELEPHONE ENCOUNTER
From: George Juarez  To: Dr. Elio Georges  Sent: 1/17/2024 5:23 PM EST  Subject: Genetics test     Still haven't received my hcm genetics test results. I called last week about it and nothing back. Do I have it or not is what I want to know. If someone could call me with my results or have it put on my chart would be great.   Thanks again Destini.

## 2024-01-29 ENCOUNTER — PATIENT MESSAGE (OUTPATIENT)
Dept: CARDIOLOGY CLINIC | Age: 34
End: 2024-01-29

## 2024-01-29 NOTE — TELEPHONE ENCOUNTER
From: George Juarez  To: Dr. Elio Georges  Sent: 1/29/2024 3:50 PM EST  Subject: Bp spikes    Hello,  I've been having spikes in my bp just randomly. Bp is normal during the day at work I feel alright. Bp usually spikes when I'm at home relaxing not doing anything or laying down. My face get flushed usually the left side of my face and ears, but sometimes my entire face or just the right side. Sometimes bp will spike and then comes back down and spikes again etc. It could be stress or aniety causing it I don't know. But just had a pretty bad episode today where it jumped to 140s over 99 then came back Down to 120s over 80s and now I just feel tired. 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 Major anxiety I feel sometimes. But today sense I got off it's been off and on with the flushing and bp [place]. Haven't had any pvcs or anything in quite some time maybe 1 or 2 here and there but it's been rare.

## 2024-04-08 ENCOUNTER — APPOINTMENT (OUTPATIENT)
Age: 34
End: 2024-04-08
Payer: COMMERCIAL

## 2024-04-08 ENCOUNTER — HOSPITAL ENCOUNTER (EMERGENCY)
Age: 34
Discharge: HOME | End: 2024-04-08
Attending: EMERGENCY MEDICINE
Payer: COMMERCIAL

## 2024-04-08 VITALS
HEIGHT: 75 IN | DIASTOLIC BLOOD PRESSURE: 83 MMHG | HEART RATE: 84 BPM | BODY MASS INDEX: 29.22 KG/M2 | RESPIRATION RATE: 19 BRPM | TEMPERATURE: 98.1 F | WEIGHT: 235 LBS | OXYGEN SATURATION: 95 % | SYSTOLIC BLOOD PRESSURE: 110 MMHG

## 2024-04-08 DIAGNOSIS — R00.2 PALPITATIONS: Primary | ICD-10-CM

## 2024-04-08 LAB
ALBUMIN SERPL-MCNC: 4.4 G/DL (ref 3.4–5)
ALBUMIN/GLOB SERPL: 1.5 {RATIO}
ALP SERPL-CCNC: 91 U/L (ref 40–129)
ALT SERPL-CCNC: 39 U/L (ref 10–40)
ANION GAP SERPL CALCULATED.3IONS-SCNC: 10 MMOL/L (ref 3–16)
AST SERPL-CCNC: 23 U/L (ref 15–37)
BASOPHILS # BLD: 0.02 K/UL (ref 0–0.2)
BASOPHILS NFR BLD: 0 %
BILIRUB SERPL-MCNC: <0.2 MG/DL (ref 0–1)
BNP SERPL-MCNC: 87 PG/ML (ref 0–124)
BUN SERPL-MCNC: 16 MG/DL (ref 7–20)
CALCIUM SERPL-MCNC: 9 MG/DL (ref 8.3–10.6)
CHLORIDE SERPL-SCNC: 107 MMOL/L (ref 99–110)
CO2 SERPL-SCNC: 23 MMOL/L (ref 21–32)
CREAT SERPL-MCNC: 0.8 MG/DL (ref 0.7–1.8)
D DIMER PPP FEU-MCNC: 0.3 UG/ML FEU (ref 0–0.6)
EOSINOPHIL # BLD: 0.07 K/UL (ref 0–0.6)
EOSINOPHILS RELATIVE PERCENT: 1 %
ERYTHROCYTE [DISTWIDTH] IN BLOOD BY AUTOMATED COUNT: 11.7 % (ref 12.4–15.4)
GFR SERPL CREATININE-BSD FRML MDRD: >90 ML/MIN/1.73M2
GLUCOSE SERPL-MCNC: 127 MG/DL (ref 70–99)
HCT VFR BLD AUTO: 39.1 % (ref 40.5–52.5)
HGB BLD-MCNC: 14 G/DL (ref 13.5–17.5)
IMM GRANULOCYTES # BLD AUTO: 0.01 K/UL (ref 0–0.5)
IMM GRANULOCYTES NFR BLD: 0 %
LYMPHOCYTES NFR BLD: 1.29 K/UL (ref 1–5.1)
LYMPHOCYTES RELATIVE PERCENT: 15 %
MCH RBC QN AUTO: 31.4 PG (ref 26–34)
MCHC RBC AUTO-ENTMCNC: 35.8 G/DL (ref 31–36)
MCV RBC AUTO: 87.7 FL (ref 80–100)
MONOCYTES NFR BLD: 0.45 K/UL (ref 0–1.3)
MONOCYTES NFR BLD: 5 %
NEUTROPHILS NFR BLD: 78 %
NEUTS SEG NFR BLD: 6.6 K/UL (ref 1.7–7.7)
PLATELET # BLD AUTO: 262 K/UL (ref 135–450)
PMV BLD AUTO: 9.5 FL
POTASSIUM SERPL-SCNC: 4.2 MMOL/L (ref 3.5–5.1)
POTASSIUM SERPL-SCNC: 4.3 MMOL/L (ref 3.5–5.1)
PROT SERPL-MCNC: 7.4 G/DL (ref 6.4–8.2)
RBC # BLD AUTO: 4.46 M/UL (ref 4.2–5.9)
SODIUM SERPL-SCNC: 140 MMOL/L (ref 136–145)
TROPONIN I SERPL HS-MCNC: 12 NG/L (ref 0–22)
TROPONIN I SERPL HS-MCNC: <6 NG/L (ref 0–22)
WBC OTHER # BLD: 8.4 K/UL (ref 4–11)

## 2024-04-08 PROCEDURE — 36415 COLL VENOUS BLD VENIPUNCTURE: CPT

## 2024-04-08 PROCEDURE — 80053 COMPREHEN METABOLIC PANEL: CPT

## 2024-04-08 PROCEDURE — 99285 EMERGENCY DEPT VISIT HI MDM: CPT

## 2024-04-08 PROCEDURE — 85025 COMPLETE CBC W/AUTO DIFF WBC: CPT

## 2024-04-08 PROCEDURE — 71046 X-RAY EXAM CHEST 2 VIEWS: CPT

## 2024-04-08 PROCEDURE — 84132 ASSAY OF SERUM POTASSIUM: CPT

## 2024-04-08 PROCEDURE — 84484 ASSAY OF TROPONIN QUANT: CPT

## 2024-04-08 PROCEDURE — 93005 ELECTROCARDIOGRAM TRACING: CPT

## 2024-04-08 PROCEDURE — 83880 ASSAY OF NATRIURETIC PEPTIDE: CPT

## 2024-04-08 PROCEDURE — 85379 FIBRIN DEGRADATION QUANT: CPT

## 2024-04-08 ASSESSMENT — PAIN - FUNCTIONAL ASSESSMENT: PAIN_FUNCTIONAL_ASSESSMENT: NONE - DENIES PAIN

## 2024-04-08 NOTE — ED PROVIDER NOTES
[facility] EMERGENCY DEPT  EMERGENCY DEPARTMENT ENCOUNTER      Pt Name: George Juarez  MRN: [id number]  Birthdate 1990  Date of evaluation: 4/8/2024  Provider: GEORGE CLAIRE DO    CHIEF COMPLAINT       Chief Complaint   Patient presents with    Tachycardia     Cutting grass and felt heart palpitations. Walked into fire house to be checked out.          HISTORY OF PRESENT ILLNESS   (Location/Symptom, Timing/Onset, Context/Setting, Quality, Duration, Modifying Factors, Severity)  Note limiting factors.   George Juarez is a 33 y.o. male who presents to the emergency department with a complaint of palpitations that he noticed while mowing he has a known history of atrial fibrillation.  He walked into the fire house and complained of palpitations.  He reports that he felt lightheaded and had some slight shortness of breath but that is since resolved.  He checked his heart rate on his watch and it was variable anywhere from 100 up to 190.  He reports that he also had a previous episode of SVT in the past.    There was no syncope today.  No diaphoresis.  He denies any cough or cold symptoms.  No fever or chills.  No nausea vomiting or diarrhea.  He did not feel dehydrated.  No focal weakness or numbness.  No vision or speech change.    He reports that he had a cardioversion approximately 1 month ago.  He has a follow-up echocardiogram scheduled for May 9, 2024.  He sees Dr. Odom at [facility].  He reports that he has been admitted several times for A-fib.  However, he reports that since he had an episode of SVT approximately 1 month ago he has been doing well until now.    He denies any current or recent alcohol use, drug use, or caffeine use.  He has eliminated all caffeine.  He does not smoke.    He reports that he was previously anticoagulated on Eliquis for approximately 1 month after his cardioversion but has since been discontinued.    Medical history was significant for hypertension,

## 2024-04-08 NOTE — ED NOTES
Pt ambulated to restroom without difficulty, and with a smooth and steady gait. Pt denied dizziness upon standing or during ambulation.

## 2024-04-08 NOTE — DISCHARGE INSTRUCTIONS
Continue current medications.    Make sure that you continue to avoid caffeine and any stimulant medications.  Avoid energy drinks.    Drink plenty of fluids.    Follow-up with your electrophysiologist/cardiologist in 1 to 2 days.    If condition worsens or new symptoms develop, return immediately to the emergency department.

## 2024-04-08 NOTE — PROGRESS NOTES
Pt d/c home in stable condition. Discharge instructions explained, all questions and concerns answered. Pt walked independently to lobby.

## 2024-04-08 NOTE — ED NOTES
Pt back from diagnostic testing. Respirations even and unlabored without signs of distress noted. Call light within reach.

## 2024-04-09 LAB
EKG ATRIAL RATE: 106 BPM
EKG DIAGNOSIS: NORMAL
EKG P AXIS: 73 DEGREES
EKG P-R INTERVAL: 178 MS
EKG Q-T INTERVAL: 336 MS
EKG QRS DURATION: 90 MS
EKG QTC CALCULATION (BAZETT): 446 MS
EKG R AXIS: 65 DEGREES
EKG T AXIS: 32 DEGREES
EKG VENTRICULAR RATE: 106 BPM

## 2024-04-15 ENCOUNTER — TELEPHONE (OUTPATIENT)
Dept: CARDIOLOGY CLINIC | Age: 34
End: 2024-04-15

## 2024-04-15 NOTE — TELEPHONE ENCOUNTER
Pt has change in pharmacy ([facility])     verapamil (CALAN SR) 180 MG extended release tablet   270 mg  135 tablets  Take 1.5 tablets by mouth daily     [facility] [id number] - [facility] [address] - P [phone] - F [phone]

## 2024-04-15 NOTE — TELEPHONE ENCOUNTER
I spoke with pharmacy and he stated that pt has refills. A script was sent in January. Pharmacist said they will have ready tomorrow after 2pm.

## 2024-05-07 NOTE — PROGRESS NOTES
septum   -Echo 8/2023 showed moderate septal hypertrophy   -Father has hx of CHF   -Genetic testing 11/2023 was negative    -Order placed for cardiac MRI, unable to complete due to ectopy    HTN  -Controlled:106/68  -BP goal <130/80  -Home BP monitoring encouraged, printed information provided on how to accurately measure BP at home.  -Counseled to follow a low salt diet to assure blood pressure remains controlled for cardiovascular risk factor modification.   -The patient is counseled to get regular exercise 3-5 times per week and maintain a healthy weight reduce cardiovascular risk factors.     Obesity  Body mass index is 29.31 kg/m².  -Excessive weight is complicating assessment and treatment. It is placing patient at risk for multiple co-morbidities as well as early death and contributing to the patient's presentation.  -Discussed weight management with diet and exercise      Alcohol use  -states he quit drinking 8/2023    Plan:   1 year NP    Patient Active Problem List    Diagnosis Date Noted    Palpitation 02/27/2023    Obesity (BMI 30.0-34.9) 08/16/2023    Benign essential HTN 03/30/2022    Alcohol abuse     Paroxysmal atrial fibrillation (HCC) 03/11/2019    Laceration of hand, right 04/12/2011     Diagnostic studies:   ECG 5/9/24  SR,    Echo 5/9/2024  Normal LV function  Echo 8/16/2023  Low normal global systolic function with an ejection fraction estimated at 50-55%. No definitive regional wall motion abnormalities noted. Moderate septal hypertrophy. Grade I diastolic dysfunction with normal LV filling pressures. Avg. E/e'=5.1 Average global longitudinal strain is estimated at -13.8% (Abnormal) Mild mitral regurgitation. Mildly redundant mitral valve leaflets. Recommend Cardiac MRI to evaluate for hypertrophic cardiomyopathy     Echo 4/1/2020  Summary   Normal left ventricle size, wall thickness and systolic function with an   estimated ejection fraction of 60%. No regional wall motion abnormalities   are

## 2024-05-09 ENCOUNTER — OFFICE VISIT (OUTPATIENT)
Dept: CARDIOLOGY CLINIC | Age: 34
End: 2024-05-09
Payer: COMMERCIAL

## 2024-05-09 ENCOUNTER — TELEPHONE (OUTPATIENT)
Dept: CARDIOLOGY CLINIC | Age: 34
End: 2024-05-09

## 2024-05-09 ENCOUNTER — HOSPITAL ENCOUNTER (OUTPATIENT)
Age: 34
Discharge: HOME | End: 2024-05-11
Attending: INTERNAL MEDICINE
Payer: COMMERCIAL

## 2024-05-09 VITALS
WEIGHT: 234 LBS | DIASTOLIC BLOOD PRESSURE: 68 MMHG | HEART RATE: 90 BPM | HEIGHT: 75 IN | BODY MASS INDEX: 29.09 KG/M2 | SYSTOLIC BLOOD PRESSURE: 106 MMHG | OXYGEN SATURATION: 96 %

## 2024-05-09 VITALS
BODY MASS INDEX: 29.22 KG/M2 | SYSTOLIC BLOOD PRESSURE: 110 MMHG | HEIGHT: 75 IN | DIASTOLIC BLOOD PRESSURE: 83 MMHG | WEIGHT: 235 LBS

## 2024-05-09 DIAGNOSIS — I48.0 PAROXYSMAL ATRIAL FIBRILLATION (HCC): Primary | ICD-10-CM

## 2024-05-09 DIAGNOSIS — I48.91 ATRIAL FIBRILLATION, UNSPECIFIED TYPE (HCC): ICD-10-CM

## 2024-05-09 DIAGNOSIS — R00.2 PALPITATION: ICD-10-CM

## 2024-05-09 DIAGNOSIS — F10.10 ALCOHOL ABUSE: ICD-10-CM

## 2024-05-09 DIAGNOSIS — I10 BENIGN ESSENTIAL HTN: ICD-10-CM

## 2024-05-09 LAB
ECHO AO ASC DIAM: 3.5 CM
ECHO AO ASCENDING AORTA INDEX: 1.49 CM/M2
ECHO AO ROOT DIAM: 3.6 CM
ECHO AO ROOT INDEX: 1.53 CM/M2
ECHO AV AREA PEAK VELOCITY: 3.8 CM2
ECHO AV AREA VTI: 3.8 CM2
ECHO AV AREA/BSA PEAK VELOCITY: 1.6 CM2/M2
ECHO AV AREA/BSA VTI: 1.6 CM2/M2
ECHO AV MEAN GRADIENT: 2 MMHG
ECHO AV MEAN VELOCITY: 0.7 M/S
ECHO AV PEAK GRADIENT: 5 MMHG
ECHO AV PEAK VELOCITY: 1.1 M/S
ECHO AV VELOCITY RATIO: 0.91
ECHO AV VTI: 19.7 CM
ECHO BSA: 2.37 M2
ECHO LA AREA 4C: 20.8 CM2
ECHO LA DIAMETER INDEX: 1.53 CM/M2
ECHO LA DIAMETER: 3.6 CM
ECHO LA MAJOR AXIS: 6.5 CM
ECHO LA MINOR AXIS: 3.6 CM
ECHO LA TO AORTIC ROOT RATIO: 1
ECHO LA VOL BP: 46 ML (ref 18–58)
ECHO LA VOL MOD A4C: 46 ML (ref 18–58)
ECHO LA VOL/BSA BIPLANE: 20 ML/M2 (ref 16–34)
ECHO LA VOLUME INDEX MOD A4C: 20 ML/M2 (ref 16–34)
ECHO LV E' LATERAL VELOCITY: 18 CM/S
ECHO LV E' SEPTAL VELOCITY: 10 CM/S
ECHO LV EDV A2C: 99 ML
ECHO LV EDV A4C: 112 ML
ECHO LV EDV INDEX A4C: 48 ML/M2
ECHO LV EDV NDEX A2C: 42 ML/M2
ECHO LV EJECTION FRACTION A2C: 55 %
ECHO LV EJECTION FRACTION A4C: 60 %
ECHO LV EJECTION FRACTION BIPLANE: 57 % (ref 55–100)
ECHO LV ESV A2C: 45 ML
ECHO LV ESV A4C: 45 ML
ECHO LV ESV INDEX A2C: 19 ML/M2
ECHO LV ESV INDEX A4C: 19 ML/M2
ECHO LV FRACTIONAL SHORTENING: 34 % (ref 28–44)
ECHO LV INTERNAL DIMENSION DIASTOLE INDEX: 2.13 CM/M2
ECHO LV INTERNAL DIMENSION DIASTOLIC: 5 CM (ref 4.2–5.9)
ECHO LV INTERNAL DIMENSION SYSTOLIC INDEX: 1.4 CM/M2
ECHO LV INTERNAL DIMENSION SYSTOLIC: 3.3 CM
ECHO LV IVSD: 1 CM (ref 0.6–1)
ECHO LV MASS 2D: 169.9 G (ref 88–224)
ECHO LV MASS INDEX 2D: 72.3 G/M2 (ref 49–115)
ECHO LV POSTERIOR WALL DIASTOLIC: 0.9 CM (ref 0.6–1)
ECHO LV RELATIVE WALL THICKNESS RATIO: 0.36
ECHO LVOT AREA: 4.2 CM2
ECHO LVOT AV VTI INDEX: 0.92
ECHO LVOT DIAM: 2.3 CM
ECHO LVOT MEAN GRADIENT: 2 MMHG
ECHO LVOT PEAK GRADIENT: 4 MMHG
ECHO LVOT PEAK VELOCITY: 1 M/S
ECHO LVOT STROKE VOLUME INDEX: 32.2 ML/M2
ECHO LVOT SV: 75.6 ML
ECHO LVOT VTI: 18.2 CM
ECHO MV A VELOCITY: 0.5 M/S
ECHO MV AREA VTI: 4.7 CM2
ECHO MV E VELOCITY: 0.5 M/S
ECHO MV E/A RATIO: 1
ECHO MV E/E' LATERAL: 2.78
ECHO MV E/E' RATIO (AVERAGED): 3.89
ECHO MV E/E' SEPTAL: 5
ECHO MV LVOT VTI INDEX: 0.89
ECHO MV MAX VELOCITY: 0.6 M/S
ECHO MV MEAN GRADIENT: 2 MMHG
ECHO MV MEAN VELOCITY: 0.4 M/S
ECHO MV PEAK GRADIENT: 2 MMHG
ECHO MV VTI: 16.2 CM
ECHO PV ACCELERATION TIME (AT): 121 MS
ECHO RA AREA 4C: 20.5 CM2
ECHO RA END SYSTOLIC VOLUME APICAL 4 CHAMBER INDEX BSA: 25 ML/M2
ECHO RA VOLUME: 59 ML
ECHO RV BASAL DIMENSION: 4.6 CM
ECHO RV FREE WALL PEAK S': 10 CM/S
ECHO RV TAPSE: 2.3 CM (ref 1.7–?)

## 2024-05-09 PROCEDURE — 99214 OFFICE O/P EST MOD 30 MIN: CPT | Performed by: INTERNAL MEDICINE

## 2024-05-09 PROCEDURE — 3074F SYST BP LT 130 MM HG: CPT | Performed by: INTERNAL MEDICINE

## 2024-05-09 PROCEDURE — 93306 TTE W/DOPPLER COMPLETE: CPT | Performed by: INTERNAL MEDICINE

## 2024-05-09 PROCEDURE — 93000 ELECTROCARDIOGRAM COMPLETE: CPT | Performed by: INTERNAL MEDICINE

## 2024-05-09 PROCEDURE — 3078F DIAST BP <80 MM HG: CPT | Performed by: INTERNAL MEDICINE

## 2024-05-09 PROCEDURE — 93306 TTE W/DOPPLER COMPLETE: CPT

## 2024-05-09 NOTE — TELEPHONE ENCOUNTER
Linda Galindo has the Approval for pt to have Ultrasound of heart valid from 5/8/24 to 6/6/24. PA# [id number]. Questions call [phone]. YNES

## 2024-09-30 ENCOUNTER — HOSPITAL ENCOUNTER (EMERGENCY)
Age: 34
Discharge: HOME | End: 2024-10-01
Attending: EMERGENCY MEDICINE
Payer: COMMERCIAL

## 2024-09-30 ENCOUNTER — APPOINTMENT (OUTPATIENT)
Age: 34
End: 2024-09-30
Payer: COMMERCIAL

## 2024-09-30 DIAGNOSIS — Z86.79 HISTORY OF ATRIAL FIBRILLATION: ICD-10-CM

## 2024-09-30 DIAGNOSIS — R00.2 PALPITATIONS: Primary | ICD-10-CM

## 2024-09-30 LAB
ALBUMIN SERPL-MCNC: 4.2 G/DL (ref 3.4–5)
ALBUMIN/GLOB SERPL: 1.5 {RATIO}
ALP SERPL-CCNC: 83 U/L (ref 40–129)
ALT SERPL-CCNC: 19 U/L (ref 10–40)
ANION GAP SERPL CALCULATED.3IONS-SCNC: 12 MMOL/L (ref 3–16)
AST SERPL-CCNC: 24 U/L (ref 15–37)
BASOPHILS # BLD: 0.05 K/UL (ref 0–0.2)
BASOPHILS NFR BLD: 1 %
BILIRUB SERPL-MCNC: 0.3 MG/DL (ref 0–1)
BUN SERPL-MCNC: 18 MG/DL (ref 7–20)
CALCIUM SERPL-MCNC: 9 MG/DL (ref 8.3–10.6)
CHLORIDE SERPL-SCNC: 105 MMOL/L (ref 99–110)
CO2 SERPL-SCNC: 23 MMOL/L (ref 21–32)
CREAT SERPL-MCNC: 0.9 MG/DL (ref 0.7–1.8)
EOSINOPHIL # BLD: 0.15 K/UL (ref 0–0.6)
EOSINOPHILS RELATIVE PERCENT: 2 %
ERYTHROCYTE [DISTWIDTH] IN BLOOD BY AUTOMATED COUNT: 11.6 % (ref 12.4–15.4)
GFR, ESTIMATED: >90 ML/MIN/1.73M2
GLUCOSE SERPL-MCNC: 113 MG/DL (ref 70–99)
HCT VFR BLD AUTO: 39.8 % (ref 40.5–52.5)
HGB BLD-MCNC: 14.3 G/DL (ref 13.5–17.5)
IMM GRANULOCYTES # BLD AUTO: 0 K/UL (ref 0–0.5)
IMM GRANULOCYTES NFR BLD: 0 %
LYMPHOCYTES NFR BLD: 2.93 K/UL (ref 1–5.1)
LYMPHOCYTES RELATIVE PERCENT: 38 %
MCH RBC QN AUTO: 31.3 PG (ref 26–34)
MCHC RBC AUTO-ENTMCNC: 35.9 G/DL (ref 31–36)
MCV RBC AUTO: 87.1 FL (ref 80–100)
MONOCYTES NFR BLD: 0.47 K/UL (ref 0–1.3)
MONOCYTES NFR BLD: 6 %
NEUTROPHILS NFR BLD: 53 %
NEUTS SEG NFR BLD: 4.06 K/UL (ref 1.7–7.7)
PLATELET # BLD AUTO: 259 K/UL (ref 135–450)
PMV BLD AUTO: 9.8 FL
POTASSIUM SERPL-SCNC: 3.6 MMOL/L (ref 3.5–5.1)
PROT SERPL-MCNC: 7 G/DL (ref 6.4–8.2)
RBC # BLD AUTO: 4.57 M/UL (ref 4.2–5.9)
SODIUM SERPL-SCNC: 139 MMOL/L (ref 136–145)
TROPONIN I SERPL HS-MCNC: 7 NG/L (ref 0–22)
TROPONIN I SERPL HS-MCNC: 7 NG/L (ref 0–22)
WBC OTHER # BLD: 7.7 K/UL (ref 4–11)

## 2024-09-30 PROCEDURE — 84484 ASSAY OF TROPONIN QUANT: CPT

## 2024-09-30 PROCEDURE — 80053 COMPREHEN METABOLIC PANEL: CPT

## 2024-09-30 PROCEDURE — 93005 ELECTROCARDIOGRAM TRACING: CPT | Performed by: EMERGENCY MEDICINE

## 2024-09-30 PROCEDURE — 85025 COMPLETE CBC W/AUTO DIFF WBC: CPT

## 2024-09-30 PROCEDURE — 99285 EMERGENCY DEPT VISIT HI MDM: CPT

## 2024-09-30 PROCEDURE — 71045 X-RAY EXAM CHEST 1 VIEW: CPT

## 2024-09-30 PROCEDURE — 93005 ELECTROCARDIOGRAM TRACING: CPT

## 2024-09-30 ASSESSMENT — LIFESTYLE VARIABLES
HOW MANY STANDARD DRINKS CONTAINING ALCOHOL DO YOU HAVE ON A TYPICAL DAY: PATIENT DOES NOT DRINK
HOW OFTEN DO YOU HAVE A DRINK CONTAINING ALCOHOL: NEVER

## 2024-09-30 ASSESSMENT — PAIN DESCRIPTION - LOCATION: LOCATION: CHEST

## 2024-09-30 ASSESSMENT — PAIN - FUNCTIONAL ASSESSMENT: PAIN_FUNCTIONAL_ASSESSMENT: 0-10

## 2024-09-30 ASSESSMENT — PAIN SCALES - GENERAL: PAINLEVEL_OUTOF10: 4

## 2024-09-30 ASSESSMENT — PAIN DESCRIPTION - PAIN TYPE: TYPE: ACUTE PAIN

## 2024-09-30 ASSESSMENT — PAIN DESCRIPTION - DESCRIPTORS: DESCRIPTORS: THROBBING

## 2024-09-30 ASSESSMENT — PAIN DESCRIPTION - FREQUENCY: FREQUENCY: CONTINUOUS

## 2024-10-01 VITALS
HEART RATE: 80 BPM | BODY MASS INDEX: 27.66 KG/M2 | SYSTOLIC BLOOD PRESSURE: 117 MMHG | WEIGHT: 222.5 LBS | OXYGEN SATURATION: 97 % | RESPIRATION RATE: 11 BRPM | HEIGHT: 75 IN | DIASTOLIC BLOOD PRESSURE: 84 MMHG | TEMPERATURE: 98.1 F

## 2024-10-01 LAB
EKG ATRIAL RATE: 89 BPM
EKG DIAGNOSIS: NORMAL
EKG P AXIS: 60 DEGREES
EKG P-R INTERVAL: 192 MS
EKG Q-T INTERVAL: 354 MS
EKG QRS DURATION: 96 MS
EKG QTC CALCULATION (BAZETT): 430 MS
EKG R AXIS: 44 DEGREES
EKG T AXIS: 34 DEGREES
EKG VENTRICULAR RATE: 89 BPM

## 2024-10-01 PROCEDURE — 93010 ELECTROCARDIOGRAM REPORT: CPT | Performed by: INTERNAL MEDICINE

## 2024-10-01 NOTE — DISCHARGE INSTRUCTIONS
Return to emergency department if recurrent palpitations chest pain difficulty breathing worse in any way or any problems.    Continue home medications.    Follow-up with your cardiologist in 1 to 2 days for recheck without fail.

## 2024-10-01 NOTE — ED PROVIDER NOTES
[place] EMERGENCY DEPT     EMERGENCY DEPARTMENT ENCOUNTER            Pt Name: George Juarez   MRN: [id number]   Birthdate 1990   Date of evaluation: 9/30/2024   Provider: Katya Taylor DO   PCP: Trey Benoit   Note Started: 10:39 PM EDT 9/30/24          CHIEF COMPLAINT     Chief Complaint   Patient presents with    Palpitations    Chest Pain             HISTORY OF PRESENT ILLNESS:   History from : Patient   Limitations to history : None     George Juarez is a 34 y.o. male who presents to emergency department ambulatory with complaints of palpitations rapid heart rate lightheadedness and chest discomfort onset 9 PM prior to admission.  He states symptoms lasted only few minutes and then resolved spontaneously.  He does report however that a short while later symptoms did recur also lasting a few minutes resolved spontaneously and were completely resolved on arrival to the emergency department.  He has had no recurrence of symptoms since arrival to the emergency department.      Patient denies headache neck pain ear pain sore throat difficulty breathing abdominal pain back pain or pain to the extremities.  Denies syncope near syncope focal weakness numbness paresthesias.    Patient has history of paroxysmal atrial fibrillation and is followed by [facility] cardiologist .  He is no longer on any anticoagulant medications.    Patient reports has had no recurrence of atrial fibrillation since August 2023 at which time he stopped all alcohol and caffeine.  He denies tobacco use or illicit drug use.  He is also lost more than 50 pounds since that time.    Patient asymptomatic at the time my evaluation.    Nursing Notes were all reviewed and agreed with, or any disagreements were addressed in the HPI.     REVIEW OF SYSTEMS :    Positives and Pertinent negatives as per HPI.      MEDICAL HISTORY   has a past medical history of Heart palpitations, Hypertension, and MATY (obstructive sleep apnea).

## 2024-10-01 NOTE — DISCHARGE INSTR - COC
Continuity of Care Form    Patient Name: George Juarez   :  1990  MRN:  [id number]    Admit date:  2024  Discharge date:  ***    Code Status Order: Prior   Advance Directives:   Advance Care Flowsheet Documentation             Admitting Physician:  No admitting provider for patient encounter.  PCP: Trey Benoit    Discharging Nurse: ***  Discharging Hospital Unit/Room#:   Discharging Unit Phone Number: ***    Emergency Contact:   Extended Emergency Contact Information  Primary Emergency Contact: Teri Juarez  Address: [address] [place] [address]  Home Phone: [phone]  Work Phone: [phone]  Mobile Phone: [phone]  Relation: Parent    Past Surgical History:  Past Surgical History:   Procedure Laterality Date    FRACTURE SURGERY      HAND SURGERY      LEFT - ORIF Boxer's Fracture    TYMPANOSTOMY TUBE PLACEMENT         Immunization History:     There is no immunization history on file for this patient.    Active Problems:  Patient Active Problem List   Diagnosis Code    Laceration of hand, right S61.411A    Paroxysmal atrial fibrillation (HCC) I48.0    Alcohol abuse F10.10    Benign essential HTN I10    Palpitation R00.2    Obesity (BMI 30.0-34.9) E66.811       Isolation/Infection:   Isolation            No Isolation          Patient Infection Status       None to display            Nurse Assessment:  Last Vital Signs: /84   Pulse 80   Temp 98.1 °F (36.7 °C) (Oral)   Resp 11   Ht 1.905 m (6' 3\")   Wt 100.9 kg (222 lb 8 oz)   SpO2 97%   BMI 27.81 kg/m²     Last documented pain score (0-10 scale): Pain Level: 4  Last Weight:   Wt Readings from Last 1 Encounters:   24 100.9 kg (222 lb 8 oz)     Mental Status:  {IP PT MENTAL STATUS:19884}    IV Access:  { IFRAH IV ACCESS:989851301}    Nursing Mobility/ADLs:  Walking   {P DME ADLs:361144992}  Transfer  {P DME ADLs:479062442}  Bathing  {P DME

## 2024-10-01 NOTE — ED TRIAGE NOTES
Pt ambulates into the ER from home with complaint of chest pain, sob, and palpitations. Pt reports symptoms started 20 minutes while getting ready for bed. Pt reports feeling dizzy and flushed during the event. Pt reports being dizzy intermittently and headache for two weeks. Pt takes verapamil 80mg daily.  Pt was taking 180mg of verapamil and 50mg of losartan decreased 6 months ago. This is the first episode since decrease. Pt denies medication prior to arrival.  Pt is A&OX4. Respirations are even and unlabored. Skin is warm, appropriate for ethnicity, and dry. No acute distress noted.

## 2024-10-07 NOTE — PROGRESS NOTES
[facility]   Electrophysiology  Zana Elliott, LC-CNP  Attending EP: Dr. Georges    Date: 10/15/2024  I had the privilege of visiting George Juarez in the office.     Chief Complaint:   Chief Complaint   Patient presents with    Atrial Fibrillation     PVCs, and one episode since ED.     Follow-up     History of Present Illness: History obtained from patient and medical record.    George Juarez is 34 y.o. male with a past medical history of PAF, HTN, alcohol use, obesity.    -Interval history: Today, George Juarez is being seen for routine follow up. He was in the ER at the end of September for palpitations. He states he felt like he in afib, but states he felt better right after getting to the ER. He is in sinus rhythm with no ectopy today. He states he has not felt his PVCs in several months. He states he stopped losartan a few months ago and cut his verapamil in half. His main issue is on going headaches. He describes them as right sided in nature. He does commercial furnace/AC unit replacement.    Denies having chest pain, shortness of breath, orthopnea/PND, cough, or dizziness at the time of this visit.    With regard to medication therapy the patient has been compliant with prescribed regimen. They have tolerated therapy to date.     Allergies:  No Known Allergies    Home Medications:  Prior to Visit Medications    Medication Sig Taking? Authorizing Provider   verapamil (CALAN SR) 180 MG extended release tablet Take 1.5 tablets by mouth daily  Patient taking differently: Take 1.5 tablets by mouth daily Pt taking 80mg daily Yes Elio Georges MD      Past Medical History:  Past Medical History:   Diagnosis Date    Heart palpitations     Hypertension     MATY (obstructive sleep apnea)     suspected     Past Surgical History:    has a past surgical history that includes Hand surgery; Tympanostomy tube placement; and fracture surgery.     Social History:  Personally Reviewed.  reports that he has quit

## 2024-10-15 ENCOUNTER — OFFICE VISIT (OUTPATIENT)
Dept: CARDIOLOGY CLINIC | Age: 34
End: 2024-10-15
Payer: COMMERCIAL

## 2024-10-15 VITALS
HEART RATE: 84 BPM | DIASTOLIC BLOOD PRESSURE: 80 MMHG | HEIGHT: 75 IN | BODY MASS INDEX: 27.47 KG/M2 | SYSTOLIC BLOOD PRESSURE: 130 MMHG | OXYGEN SATURATION: 98 % | WEIGHT: 220.9 LBS

## 2024-10-15 DIAGNOSIS — I48.0 PAROXYSMAL ATRIAL FIBRILLATION (HCC): Primary | ICD-10-CM

## 2024-10-15 DIAGNOSIS — F10.10 ALCOHOL ABUSE: ICD-10-CM

## 2024-10-15 DIAGNOSIS — E66.811 OBESITY (BMI 30.0-34.9): ICD-10-CM

## 2024-10-15 DIAGNOSIS — I10 BENIGN ESSENTIAL HTN: ICD-10-CM

## 2024-10-15 PROCEDURE — 99214 OFFICE O/P EST MOD 30 MIN: CPT | Performed by: NURSE PRACTITIONER

## 2024-10-15 PROCEDURE — 93000 ELECTROCARDIOGRAM COMPLETE: CPT | Performed by: NURSE PRACTITIONER

## 2024-10-15 PROCEDURE — 3079F DIAST BP 80-89 MM HG: CPT | Performed by: NURSE PRACTITIONER

## 2024-10-15 PROCEDURE — 3075F SYST BP GE 130 - 139MM HG: CPT | Performed by: NURSE PRACTITIONER

## 2025-03-17 RX ORDER — DILTIAZEM HYDROCHLORIDE 30 MG/1
TABLET, FILM COATED ORAL
Qty: 120 TABLET | Refills: 1 | Status: SHIPPED | OUTPATIENT
Start: 2025-03-17

## 2025-03-17 NOTE — TELEPHONE ENCOUNTER
Last ov:10/15/24 NPSR  Next ov: recall list 2/09/25 NPSR  Last EKG:10/15/24  Last labs:9/30/24  Last filled:   Disp Refills Start End    dilTIAZem (CARDIZEM) 30 MG immediate release tablet 120 tablet 0 2/14/2025 --    Sig - Route: Take 1 tablet by mouth 4 times daily as needed (Palpitations) - Oral    Sent to pharmacy as: dilTIAZem HCl 30 MG Oral Tablet (Cardizem)    E-Prescribing Status: Receipt confirmed by pharmacy (2/14/2025 10:31 AM EST)          2/14/25  7:32 AM  George,     You could just be having a short episode of atrial tachycardia. I can send you in some as needed cardizem to use if the episodes persist. The only issue with that is the episodes seem to be so short that the medication won't be working until well after the episode ends     Zana Elliott, APRN-CNP